# Patient Record
Sex: MALE | Race: WHITE | NOT HISPANIC OR LATINO | Employment: FULL TIME | RURAL
[De-identification: names, ages, dates, MRNs, and addresses within clinical notes are randomized per-mention and may not be internally consistent; named-entity substitution may affect disease eponyms.]

---

## 2017-02-22 ENCOUNTER — CONVERSION ENCOUNTER (OUTPATIENT)
Dept: FAMILY MEDICINE CLINIC | Facility: CLINIC | Age: 62
End: 2017-02-22

## 2017-02-23 LAB
ALBUMIN SERPL-MCNC: 4.1 G/DL (ref 3.6–5.1)
ALP SERPL-CCNC: 67 U/L (ref 40–115)
AST SERPL-CCNC: 34 U/L (ref 10–35)
BASOPHILS # BLD AUTO: 100 CELLS/UL (ref 0–200)
BILIRUB SERPL-MCNC: 0.9 MG/DL (ref 0.2–1.2)
BUN SERPL-MCNC: 20 MG/DL (ref 7–25)
BUN/CREAT SERPL: 20 (CALC) (ref 6–22)
CALCIUM SERPL-MCNC: 9.8 MG/DL (ref 8.6–10.2)
CHLORIDE SERPL-SCNC: 107 MMOL/L (ref 98–110)
CHOLEST SERPL-MCNC: 204 MG/DL (ref 125–200)
CONV CO2: 30 MMOL/L (ref 21–33)
CONV TOTAL PROTEIN: 6.7 G/DL (ref 6.2–8.3)
CREAT UR-MCNC: 1 MG/DL (ref 0.76–1.46)
EOSINOPHIL # BLD AUTO: 400 CELLS/UL (ref 15–500)
EOSINOPHIL # BLD AUTO: 6 %
ERYTHROCYTE [DISTWIDTH] IN BLOOD BY AUTOMATED COUNT: 13.7 % (ref 11–15)
GLOBULIN UR ELPH-MCNC: 2.6 MG/DL (ref 2.1–3.7)
GLUCOSE UR QL: 131 MG/DL (ref 65–99)
HCT VFR BLD AUTO: 47.9 % (ref 38.5–50)
HDLC SERPL-MCNC: 40 MG/DL
HGB BLD-MCNC: 16.2 G/DL (ref 13.2–17.1)
INSULIN SERPL-ACNC: 1.6 (CALC) (ref 1–2.1)
LDLC SERPL CALC-MCNC: 136 MG/DL
LYMPHOCYTES # BLD AUTO: 1600 CELLS/UL (ref 850–3900)
LYMPHOCYTES NFR BLD AUTO: 28 %
MCH RBC QN AUTO: 32.3 PG (ref 27–33)
MCHC RBC AUTO-ENTMCNC: 33.9 G/DL (ref 32–36)
MCV RBC AUTO: 95.3 FL (ref 80–100)
MONOCYTES # BLD AUTO: 600 CELLS/UL (ref 200–950)
MONOCYTES NFR BLD AUTO: 10 %
NEUTROPHILS # BLD AUTO: 3200 CELLS/UL (ref 1500–7800)
NEUTROPHILS NFR BLD AUTO: 55 %
PLATELET # BLD AUTO: 232 10*3/UL (ref 140–400)
PMV BLD AUTO: 8.8 FL (ref 7.5–11.5)
POTASSIUM SERPL-SCNC: 4.8 MMOL/L (ref 3.5–5.3)
PSA SERPL-MCNC: 0.3 NG/ML
RBC # BLD AUTO: 5.03 MILLION/UL (ref 4.2–5.8)
SODIUM SERPL-SCNC: 146 MMOL/L (ref 135–146)
TRIGL SERPL-MCNC: 138 MG/DL
TSH SERPL-ACNC: 4.64 MIU/L (ref 0.4–4.5)
WBC # BLD AUTO: 5.8 10*3/UL (ref 3.8–10.8)

## 2017-03-16 ENCOUNTER — HOSPITAL ENCOUNTER (OUTPATIENT)
Dept: OTHER | Facility: HOSPITAL | Age: 62
Discharge: HOME | End: 2017-03-16
Attending: FAMILY MEDICINE | Admitting: FAMILY MEDICINE

## 2017-07-03 ENCOUNTER — HOSPITAL ENCOUNTER (OUTPATIENT)
Dept: PREADMISSION TESTING | Facility: HOSPITAL | Age: 62
Discharge: HOME | End: 2017-07-03
Attending: NEUROLOGICAL SURGERY | Admitting: NEUROLOGICAL SURGERY

## 2017-07-03 LAB
ABO + RH BLD: NORMAL
ALBUMIN SERPL-MCNC: 4.3 G/DL (ref 3.5–4.8)
ALBUMIN/GLOB SERPL: 1.4 {RATIO} (ref 1–1.7)
ALP SERPL-CCNC: 65 IU/L (ref 32–91)
ALT SERPL-CCNC: 75 IU/L (ref 17–63)
ANION GAP SERPL CALC-SCNC: 14 MMOL/L (ref 10–20)
APTT BLD: 22.3 SEC (ref 24–31)
ARMBAND: NORMAL
AST SERPL-CCNC: 40 IU/L (ref 15–41)
BACTERIA SPEC AEROBE CULT: NORMAL
BASOPHILS # BLD AUTO: 0.1 10*3/UL (ref 0–0.2)
BASOPHILS NFR BLD AUTO: 1 % (ref 0–2)
BILIRUB SERPL-MCNC: 0.9 MG/DL (ref 0.3–1.2)
BILIRUB UR QL STRIP: NEGATIVE MG/DL
BLD COMPONENT TYPE: NORMAL
BLD GP AB SCN SERPL QL: NEGATIVE
BUN SERPL-MCNC: 16 MG/DL (ref 8–20)
BUN/CREAT SERPL: 17.8 (ref 6.2–20.3)
CALCIUM SERPL-MCNC: 9.5 MG/DL (ref 8.9–10.3)
CASTS URNS QL MICRO: NORMAL /[LPF]
CHLORIDE SERPL-SCNC: 103 MMOL/L (ref 101–111)
COLOR UR: YELLOW
CONV BACTERIA IN URINE MICRO: NEGATIVE
CONV CLARITY OF URINE: CLEAR
CONV CO2: 24 MMOL/L (ref 22–32)
CONV HYALINE CASTS IN URINE MICRO: 0 /[LPF] (ref 0–5)
CONV PROTEIN IN URINE BY AUTOMATED TEST STRIP: NEGATIVE MG/DL
CONV SMALL ROUND CELLS: NORMAL /[HPF]
CONV TOTAL PROTEIN: 7.4 G/DL (ref 6.1–7.9)
CONV UROBILINOGEN IN URINE BY AUTOMATED TEST STRIP: 0.2 MG/DL
CREAT UR-MCNC: 0.9 MG/DL (ref 0.7–1.2)
CROSSMATCH EXPIRATION: NORMAL
CULTURE INDICATED?: NORMAL
DIFFERENTIAL METHOD BLD: (no result)
EOSINOPHIL # BLD AUTO: 0.2 10*3/UL (ref 0–0.3)
EOSINOPHIL # BLD AUTO: 4 % (ref 0–3)
ERYTHROCYTE [DISTWIDTH] IN BLOOD BY AUTOMATED COUNT: 13.3 % (ref 11.5–14.5)
GLOBULIN UR ELPH-MCNC: 3.1 G/DL (ref 2.5–3.8)
GLUCOSE SERPL-MCNC: 130 MG/DL (ref 65–99)
GLUCOSE UR QL: NEGATIVE MG/DL
HCT VFR BLD AUTO: 44.6 % (ref 40–54)
HGB BLD-MCNC: 15.3 G/DL (ref 14–18)
HGB UR QL STRIP: NEGATIVE
INR PPP: 0.9
KETONES UR QL STRIP: NEGATIVE MG/DL
LEUKOCYTE ESTERASE UR QL STRIP: NEGATIVE
LYMPHOCYTES # BLD AUTO: 1.7 10*3/UL (ref 0.8–4.8)
LYMPHOCYTES NFR BLD AUTO: 26 % (ref 18–42)
Lab: NORMAL
MCH RBC QN AUTO: 32.4 PG (ref 26–32)
MCHC RBC AUTO-ENTMCNC: 34.2 G/DL (ref 32–36)
MCV RBC AUTO: 94.5 FL (ref 80–94)
MICRO REPORT STATUS: NORMAL
MONOCYTES # BLD AUTO: 0.5 10*3/UL (ref 0.1–1.3)
MONOCYTES NFR BLD AUTO: 8 % (ref 2–11)
NEUTROPHILS # BLD AUTO: 3.8 10*3/UL (ref 2.3–8.6)
NEUTROPHILS NFR BLD AUTO: 61 % (ref 50–75)
NITRITE UR QL STRIP: NEGATIVE
NRBC BLD AUTO-RTO: 0 /100{WBCS}
NRBC/RBC NFR BLD MANUAL: 0 10*3/UL
PH UR STRIP.AUTO: 5.5 [PH] (ref 4.5–8)
PLATELET # BLD AUTO: 246 10*3/UL (ref 150–450)
PMV BLD AUTO: 8.6 FL (ref 7.4–10.4)
POTASSIUM SERPL-SCNC: 4 MMOL/L (ref 3.6–5.1)
PROTHROMBIN TIME: 10.4 SEC (ref 9.6–11.7)
RBC # BLD AUTO: 4.72 10*6/UL (ref 4.6–6)
RBC #/AREA URNS HPF: 0 /[HPF] (ref 0–3)
SODIUM SERPL-SCNC: 137 MMOL/L (ref 136–144)
SP GR UR: 1.02 (ref 1–1.03)
SPECIMEN SOURCE: NORMAL
SPECIMEN SOURCE: NORMAL
SPERM URNS QL MICRO: NORMAL /[HPF]
SQUAMOUS SPT QL MICRO: 0 /[HPF] (ref 0–5)
UNIDENT CRYS URNS QL MICRO: NORMAL /[HPF]
WBC # BLD AUTO: 6.3 10*3/UL (ref 4.5–11.5)
WBC #/AREA URNS HPF: 1 /[HPF] (ref 0–5)
YEAST SPEC QL WET PREP: NORMAL /[HPF]

## 2017-07-13 ENCOUNTER — HOSPITAL ENCOUNTER (OUTPATIENT)
Dept: SURGERY | Facility: HOSPITAL | Age: 62
Setting detail: HOSPITAL OUTPATIENT SURGERY
Discharge: HOME | End: 2017-07-13
Attending: NEUROLOGICAL SURGERY | Admitting: NEUROLOGICAL SURGERY

## 2017-08-03 ENCOUNTER — HOSPITAL ENCOUNTER (OUTPATIENT)
Dept: PHYSICAL THERAPY | Facility: HOSPITAL | Age: 62
Setting detail: RECURRING SERIES
Discharge: HOME | End: 2017-09-01
Attending: NEUROLOGICAL SURGERY | Admitting: NEUROLOGICAL SURGERY

## 2018-03-19 ENCOUNTER — CONVERSION ENCOUNTER (OUTPATIENT)
Dept: FAMILY MEDICINE CLINIC | Facility: CLINIC | Age: 63
End: 2018-03-19

## 2018-03-19 LAB
TESTOST FREE SERPL-MCNC: 56.7 PG/ML (ref 35–155)
TESTOST SERPL-MCNC: 374 NG/DL (ref 250–1100)

## 2018-03-24 LAB
ALBUMIN SERPL-MCNC: 4.4 G/DL (ref 3.6–5.1)
ALP SERPL-CCNC: 74 U/L (ref 40–115)
AST SERPL-CCNC: 37 U/L (ref 10–35)
BASOPHILS # BLD AUTO: 100 CELLS/UL (ref 0–200)
BILIRUB SERPL-MCNC: 0.4 MG/DL (ref 0.2–1.2)
BUN SERPL-MCNC: 14 MG/DL (ref 7–25)
BUN/CREAT SERPL: 15.6 (CALC) (ref 6–22)
CALCIUM SERPL-MCNC: 9.6 MG/DL (ref 8.6–10.2)
CHLORIDE SERPL-SCNC: 102 MMOL/L (ref 98–110)
CHOLEST SERPL-MCNC: 184 MG/DL (ref 125–200)
CONV CO2: 27 MMOL/L (ref 21–33)
CONV TOTAL PROTEIN: 6.5 G/DL (ref 6.2–8.3)
CREAT UR-MCNC: 0.9 MG/DL (ref 0.76–1.46)
EOSINOPHIL # BLD AUTO: 500 CELLS/UL (ref 15–500)
EOSINOPHIL # BLD AUTO: 7 %
ERYTHROCYTE [DISTWIDTH] IN BLOOD BY AUTOMATED COUNT: 13.7 % (ref 11–15)
GLOBULIN UR ELPH-MCNC: 2.1 MG/DL (ref 2.1–3.7)
GLUCOSE UR QL: 156 MG/DL (ref 65–99)
HCT VFR BLD AUTO: 43.9 % (ref 38.5–50)
HDLC SERPL-MCNC: 38 MG/DL
HGB BLD-MCNC: 15.2 G/DL (ref 13.2–17.1)
INSULIN SERPL-ACNC: 2.1 (CALC) (ref 1–2.1)
LDLC SERPL CALC-MCNC: 120 MG/DL
LYMPHOCYTES # BLD AUTO: 2000 CELLS/UL (ref 850–3900)
LYMPHOCYTES NFR BLD AUTO: 32 %
MCH RBC QN AUTO: 32.9 PG (ref 27–33)
MCHC RBC AUTO-ENTMCNC: 34.7 G/DL (ref 32–36)
MCV RBC AUTO: 95 FL (ref 80–100)
MONOCYTES # BLD AUTO: 600 CELLS/UL (ref 200–950)
MONOCYTES NFR BLD AUTO: 9 %
NEUTROPHILS # BLD AUTO: 3300 CELLS/UL (ref 1500–7800)
NEUTROPHILS NFR BLD AUTO: 51 %
PLATELET # BLD AUTO: 255 10*3/UL (ref 140–400)
PMV BLD AUTO: 8.2 FL (ref 7.5–11.5)
POTASSIUM SERPL-SCNC: 4.3 MMOL/L (ref 3.5–5.3)
PSA SERPL-MCNC: 0.4 NG/ML
RBC # BLD AUTO: 4.62 MILLION/UL (ref 4.2–5.8)
SODIUM SERPL-SCNC: 138 MMOL/L (ref 135–146)
TRIGL SERPL-MCNC: 131 MG/DL
WBC # BLD AUTO: 6.4 10*3/UL (ref 3.8–10.8)

## 2018-04-16 ENCOUNTER — HOSPITAL ENCOUNTER (OUTPATIENT)
Dept: PHYSICAL THERAPY | Facility: HOSPITAL | Age: 63
Setting detail: RECURRING SERIES
Discharge: HOME | End: 2018-05-29
Attending: NEUROLOGICAL SURGERY | Admitting: NEUROLOGICAL SURGERY

## 2018-05-25 ENCOUNTER — HOSPITAL ENCOUNTER (OUTPATIENT)
Dept: PREADMISSION TESTING | Facility: HOSPITAL | Age: 63
Discharge: HOME | End: 2018-05-25
Attending: NEUROLOGICAL SURGERY | Admitting: NEUROLOGICAL SURGERY

## 2018-05-25 LAB
ABO + RH BLD: NORMAL
ALBUMIN SERPL-MCNC: 3.9 G/DL (ref 3.5–4.8)
ALBUMIN/GLOB SERPL: 1.3 {RATIO} (ref 1–1.7)
ALP SERPL-CCNC: 66 IU/L (ref 32–91)
ALT SERPL-CCNC: 44 IU/L (ref 17–63)
ANION GAP SERPL CALC-SCNC: 11.2 MMOL/L (ref 10–20)
APTT BLD: 22.8 SEC (ref 24–31)
ARMBAND: NORMAL
AST SERPL-CCNC: 31 IU/L (ref 15–41)
BACTERIA SPEC AEROBE CULT: NORMAL
BASOPHILS # BLD AUTO: 0.1 10*3/UL (ref 0–0.2)
BASOPHILS NFR BLD AUTO: 1 % (ref 0–2)
BILIRUB SERPL-MCNC: 0.8 MG/DL (ref 0.3–1.2)
BILIRUB UR QL STRIP: NEGATIVE MG/DL
BLD COMPONENT TYPE: NORMAL
BLD GP AB SCN SERPL QL: NEGATIVE
BUN SERPL-MCNC: 20 MG/DL (ref 8–20)
BUN/CREAT SERPL: 20 (ref 6.2–20.3)
CALCIUM SERPL-MCNC: 9.7 MG/DL (ref 8.9–10.3)
CASTS URNS QL MICRO: NORMAL /[LPF]
CHLORIDE SERPL-SCNC: 101 MMOL/L (ref 101–111)
COLOR UR: YELLOW
CONV BACTERIA IN URINE MICRO: NEGATIVE
CONV CLARITY OF URINE: CLEAR
CONV CO2: 28 MMOL/L (ref 22–32)
CONV HYALINE CASTS IN URINE MICRO: 0 /[LPF] (ref 0–5)
CONV PROTEIN IN URINE BY AUTOMATED TEST STRIP: NEGATIVE MG/DL
CONV SMALL ROUND CELLS: NORMAL /[HPF]
CONV TOTAL PROTEIN: 6.8 G/DL (ref 6.1–7.9)
CONV UROBILINOGEN IN URINE BY AUTOMATED TEST STRIP: 0.2 MG/DL
CREAT UR-MCNC: 1 MG/DL (ref 0.7–1.2)
CROSSMATCH EXPIRATION: NORMAL
CULTURE INDICATED?: NORMAL
DIFFERENTIAL METHOD BLD: (no result)
EOSINOPHIL # BLD AUTO: 0.4 10*3/UL (ref 0–0.3)
EOSINOPHIL # BLD AUTO: 6 % (ref 0–3)
ERYTHROCYTE [DISTWIDTH] IN BLOOD BY AUTOMATED COUNT: 13.5 % (ref 11.5–14.5)
GLOBULIN UR ELPH-MCNC: 2.9 G/DL (ref 2.5–3.8)
GLUCOSE SERPL-MCNC: 105 MG/DL (ref 65–99)
GLUCOSE UR QL: NEGATIVE MG/DL
HCT VFR BLD AUTO: 46.2 % (ref 40–54)
HGB BLD-MCNC: 15.5 G/DL (ref 14–18)
HGB UR QL STRIP: NEGATIVE
INR PPP: 1
KETONES UR QL STRIP: NEGATIVE MG/DL
LEUKOCYTE ESTERASE UR QL STRIP: NEGATIVE
LYMPHOCYTES # BLD AUTO: 1.7 10*3/UL (ref 0.8–4.8)
LYMPHOCYTES NFR BLD AUTO: 24 % (ref 18–42)
Lab: NORMAL
MCH RBC QN AUTO: 32.7 PG (ref 26–32)
MCHC RBC AUTO-ENTMCNC: 33.5 G/DL (ref 32–36)
MCV RBC AUTO: 97.5 FL (ref 80–94)
MICRO REPORT STATUS: NORMAL
MONOCYTES # BLD AUTO: 0.7 10*3/UL (ref 0.1–1.3)
MONOCYTES NFR BLD AUTO: 11 % (ref 2–11)
NEUTROPHILS # BLD AUTO: 4 10*3/UL (ref 2.3–8.6)
NEUTROPHILS NFR BLD AUTO: 58 % (ref 50–75)
NITRITE UR QL STRIP: NEGATIVE
NRBC BLD AUTO-RTO: 0 /100{WBCS}
NRBC/RBC NFR BLD MANUAL: 0 10*3/UL
PH UR STRIP.AUTO: 6.5 [PH] (ref 4.5–8)
PLATELET # BLD AUTO: 253 10*3/UL (ref 150–450)
PMV BLD AUTO: 8.2 FL (ref 7.4–10.4)
POTASSIUM SERPL-SCNC: 4.2 MMOL/L (ref 3.6–5.1)
PROTHROMBIN TIME: 10.5 SEC (ref 9.6–11.7)
RBC # BLD AUTO: 4.73 10*6/UL (ref 4.6–6)
RBC #/AREA URNS HPF: 0 /[HPF] (ref 0–3)
SODIUM SERPL-SCNC: 136 MMOL/L (ref 136–144)
SP GR UR: 1.03 (ref 1–1.03)
SPECIMEN SOURCE: NORMAL
SPERM URNS QL MICRO: NORMAL /[HPF]
SQUAMOUS SPT QL MICRO: 0 /[HPF] (ref 0–5)
UNIDENT CRYS URNS QL MICRO: NORMAL /[HPF]
WBC # BLD AUTO: 6.9 10*3/UL (ref 4.5–11.5)
WBC #/AREA URNS HPF: 1 /[HPF] (ref 0–5)
YEAST SPEC QL WET PREP: NORMAL /[HPF]

## 2018-06-07 ENCOUNTER — HOSPITAL ENCOUNTER (OUTPATIENT)
Dept: PREOP | Facility: HOSPITAL | Age: 63
Setting detail: HOSPITAL OUTPATIENT SURGERY
Discharge: HOME | End: 2018-06-07
Attending: NEUROLOGICAL SURGERY | Admitting: NEUROLOGICAL SURGERY

## 2018-06-07 LAB
GLUCOSE BLD-MCNC: 111 MG/DL (ref 70–105)
GLUCOSE BLD-MCNC: 145 MG/DL (ref 70–105)
GLUCOSE BLD-MCNC: 156 MG/DL (ref 70–105)

## 2018-06-29 ENCOUNTER — HOSPITAL ENCOUNTER (OUTPATIENT)
Dept: PHYSICAL THERAPY | Facility: HOSPITAL | Age: 63
Setting detail: RECURRING SERIES
Discharge: HOME | End: 2018-07-31
Attending: NEUROLOGICAL SURGERY | Admitting: NEUROLOGICAL SURGERY

## 2019-07-10 RX ORDER — MELOXICAM 15 MG/1
TABLET ORAL
Qty: 90 TABLET | Refills: 4 | Status: SHIPPED | OUTPATIENT
Start: 2019-07-10 | End: 2020-07-13

## 2019-07-14 DIAGNOSIS — M54.5 LOW BACK PAIN, UNSPECIFIED BACK PAIN LATERALITY, UNSPECIFIED CHRONICITY, WITH SCIATICA PRESENCE UNSPECIFIED: Primary | ICD-10-CM

## 2019-07-19 DIAGNOSIS — M54.5 LOW BACK PAIN, UNSPECIFIED BACK PAIN LATERALITY, UNSPECIFIED CHRONICITY, WITH SCIATICA PRESENCE UNSPECIFIED: Primary | ICD-10-CM

## 2019-07-19 RX ORDER — GABAPENTIN 100 MG/1
3 CAPSULE ORAL EVERY 24 HOURS
COMMUNITY
Start: 2018-08-16 | End: 2019-07-19 | Stop reason: SDUPTHER

## 2019-07-19 RX ORDER — GABAPENTIN 100 MG/1
CAPSULE ORAL
Qty: 180 CAPSULE | Refills: 2 | Status: SHIPPED | OUTPATIENT
Start: 2019-07-19 | End: 2019-10-02 | Stop reason: SDUPTHER

## 2019-07-19 RX ORDER — GABAPENTIN 100 MG/1
300 CAPSULE ORAL EVERY 24 HOURS
Qty: 90 CAPSULE | Refills: 0 | Status: SHIPPED | OUTPATIENT
Start: 2019-07-19 | End: 2019-08-27

## 2019-07-19 NOTE — TELEPHONE ENCOUNTER
PATIENT WOULD LIKE TO HAVE LABS PUT IN TO HAVE THEM DRAWN AT [facility] PRIOR TO HIS PE APPOINTMENT. HIS [facility] APPT IS ON 8/20/19

## 2019-08-12 PROBLEM — J45.909 ASTHMA: Status: ACTIVE | Noted: 2019-08-12

## 2019-08-12 PROBLEM — I10 HYPERTENSION, BENIGN: Status: ACTIVE | Noted: 2018-08-16

## 2019-08-12 PROBLEM — E78.5 HYPERLIPIDEMIA: Status: ACTIVE | Noted: 2019-08-12

## 2019-08-12 PROBLEM — G47.33 OBSTRUCTIVE SLEEP APNEA: Status: ACTIVE | Noted: 2018-08-16

## 2019-08-12 PROBLEM — E11.9 TYPE II DIABETES MELLITUS: Status: ACTIVE | Noted: 2019-08-12

## 2019-08-12 PROBLEM — N52.9 IMPOTENCE OF ORGANIC ORIGIN: Status: ACTIVE | Noted: 2019-08-12

## 2019-08-12 PROBLEM — G56.00 CARPAL TUNNEL SYNDROME: Status: ACTIVE | Noted: 2019-08-12

## 2019-08-12 PROBLEM — F43.0 ACUTE REACTION TO STRESS: Status: ACTIVE | Noted: 2019-08-12

## 2019-08-12 PROBLEM — Z00.01 ENCOUNTER FOR ANNUAL GENERAL MEDICAL EXAMINATION WITH ABNORMAL FINDINGS IN ADULT: Status: ACTIVE | Noted: 2019-08-12

## 2019-08-12 PROBLEM — D12.6 BENIGN NEOPLASM OF COLON: Status: ACTIVE | Noted: 2019-08-12

## 2019-08-12 PROBLEM — M54.41 ACUTE RIGHT-SIDED LOW BACK PAIN WITH RIGHT-SIDED SCIATICA: Status: ACTIVE | Noted: 2019-08-12

## 2019-08-12 PROBLEM — M51.9 LUMBAR DISC DISEASE: Status: ACTIVE | Noted: 2019-08-12

## 2019-08-12 RX ORDER — ASPIRIN 81 MG/1
81 TABLET, CHEWABLE ORAL DAILY
COMMUNITY

## 2019-08-12 RX ORDER — SILDENAFIL CITRATE 20 MG/1
1 TABLET ORAL
COMMUNITY
End: 2019-09-01

## 2019-08-12 RX ORDER — CYCLOBENZAPRINE HCL 10 MG
.5-1 TABLET ORAL DAILY
COMMUNITY
Start: 2018-12-18 | End: 2019-09-30

## 2019-08-12 RX ORDER — LISINOPRIL 20 MG/1
1 TABLET ORAL DAILY
COMMUNITY
Start: 2018-10-23 | End: 2019-10-09 | Stop reason: SDUPTHER

## 2019-08-12 RX ORDER — NYSTATIN 10B UNIT
1 POWDER (EA) MISCELLANEOUS 2 TIMES DAILY PRN
COMMUNITY
Start: 2018-10-22

## 2019-08-12 RX ORDER — TIZANIDINE 4 MG/1
.5-1 TABLET ORAL NIGHTLY PRN
COMMUNITY
Start: 2018-09-14 | End: 2019-09-30

## 2019-08-13 ENCOUNTER — HOSPITAL ENCOUNTER (OUTPATIENT)
Dept: GENERAL RADIOLOGY | Facility: HOSPITAL | Age: 64
Discharge: HOME | End: 2019-08-13

## 2019-08-13 ENCOUNTER — OFFICE VISIT (OUTPATIENT)
Dept: FAMILY MEDICINE CLINIC | Facility: CLINIC | Age: 64
End: 2019-08-13

## 2019-08-13 ENCOUNTER — HOSPITAL ENCOUNTER (OUTPATIENT)
Dept: GENERAL RADIOLOGY | Facility: HOSPITAL | Age: 64
Discharge: HOME | End: 2019-08-13
Admitting: FAMILY MEDICINE

## 2019-08-13 VITALS
HEIGHT: 72 IN | TEMPERATURE: 97.3 F | OXYGEN SATURATION: 97 % | RESPIRATION RATE: 17 BRPM | SYSTOLIC BLOOD PRESSURE: 138 MMHG | WEIGHT: 216.6 LBS | DIASTOLIC BLOOD PRESSURE: 84 MMHG | BODY MASS INDEX: 29.34 KG/M2 | HEART RATE: 88 BPM

## 2019-08-13 DIAGNOSIS — M51.9 LUMBAR DISC DISEASE: ICD-10-CM

## 2019-08-13 DIAGNOSIS — M54.41 ACUTE RIGHT-SIDED LOW BACK PAIN WITH RIGHT-SIDED SCIATICA: ICD-10-CM

## 2019-08-13 DIAGNOSIS — Z12.5 SCREENING PSA (PROSTATE SPECIFIC ANTIGEN): ICD-10-CM

## 2019-08-13 DIAGNOSIS — M16.12 PRIMARY OSTEOARTHRITIS OF LEFT HIP: ICD-10-CM

## 2019-08-13 DIAGNOSIS — E11.9 TYPE 2 DIABETES MELLITUS WITHOUT COMPLICATION, WITHOUT LONG-TERM CURRENT USE OF INSULIN (HCC): Primary | ICD-10-CM

## 2019-08-13 DIAGNOSIS — E55.9 VITAMIN D DEFICIENCY: ICD-10-CM

## 2019-08-13 DIAGNOSIS — I10 HYPERTENSION, BENIGN: ICD-10-CM

## 2019-08-13 DIAGNOSIS — I10 BENIGN HYPERTENSION: ICD-10-CM

## 2019-08-13 DIAGNOSIS — E78.5 HYPERLIPIDEMIA, UNSPECIFIED HYPERLIPIDEMIA TYPE: ICD-10-CM

## 2019-08-13 PROBLEM — R53.83 OTHER FATIGUE: Status: ACTIVE | Noted: 2019-08-13

## 2019-08-13 PROBLEM — R41.3 MEMORY LOSS: Status: ACTIVE | Noted: 2019-08-13

## 2019-08-13 PROBLEM — M25.552 PAIN OF LEFT HIP JOINT: Status: ACTIVE | Noted: 2019-08-13

## 2019-08-13 LAB — HBA1C MFR BLD: 5.9 %

## 2019-08-13 PROCEDURE — 73502 X-RAY EXAM HIP UNI 2-3 VIEWS: CPT

## 2019-08-13 PROCEDURE — 83036 HEMOGLOBIN GLYCOSYLATED A1C: CPT | Performed by: FAMILY MEDICINE

## 2019-08-13 PROCEDURE — 72110 X-RAY EXAM L-2 SPINE 4/>VWS: CPT

## 2019-08-13 PROCEDURE — 99214 OFFICE O/P EST MOD 30 MIN: CPT | Performed by: FAMILY MEDICINE

## 2019-08-13 NOTE — PROGRESS NOTES
Subjective   Rudy Henriquez Jr. is a 64 y.o. male.     Chief Complaint   Patient presents with   • Diabetes     last micro was 20 on 10/22/2018   • Leg Pain     started 5 months ago and is increasing       Diabetes   He presents for his follow-up diabetic visit. He has type 2 diabetes mellitus. His disease course has been stable. There are no hypoglycemic associated symptoms. Pertinent negatives for hypoglycemia include no pallor or seizures. Associated symptoms include foot paresthesias. Pertinent negatives for diabetes include no chest pain, no polydipsia and no polyphagia. There are no hypoglycemic complications. Symptoms are stable. Risk factors for coronary artery disease include diabetes mellitus, hypertension and male sex. Current diabetic treatment includes diet. He is compliant with treatment all of the time. His weight is stable. He is following a diabetic diet. He has not had a previous visit with a dietitian. He participates in exercise daily. There is no change in his home blood glucose trend. His breakfast blood glucose is taken between 8-9 am. His breakfast blood glucose range is generally  mg/dl. An ACE inhibitor/angiotensin II receptor blocker is being taken. He does not see a podiatrist.Eye exam is not current.   Leg Pain    The incident occurred more than 1 week ago. There was no injury mechanism. The pain is present in the left hip and left knee. The quality of the pain is described as stabbing. The pain has been constant since onset. Associated symptoms include an inability to bear weight, muscle weakness and numbness. He reports no foreign bodies present. The symptoms are aggravated by weight bearing and movement. He has tried acetaminophen for the symptoms. The treatment provided no relief.            I personally reviewed and updated the patient's allergies, medications, problem list, and past medical, surgical, social, and family history.     Family History   Problem Relation Age of  Onset   • Other Mother         malignant neoplasm of colon   • Other Father         malignant neoplasm of traches, bronchi, and lungs   • Diabetes Sister        Social History     Tobacco Use   • Smoking status: Never Smoker   • Smokeless tobacco: Never Used   Substance Use Topics   • Alcohol use: No     Frequency: Never   • Drug use: No       Past Surgical History:   Procedure Laterality Date   • BACK SURGERY  06/07/2018    on L4 and L5 by dr landa   • TONSILLECTOMY AND ADENOIDECTOMY      at age 48       Patient Active Problem List   Diagnosis   • Acute reaction to stress   • Asthma   • Obstructive sleep apnea   • Acute left-sided low back pain with right-sided sciatica   • Benign neoplasm of colon   • Carpal tunnel syndrome   • Encounter for annual general medical examination with abnormal findings in adult   • Hyperlipidemia   • Hypertension, benign   • Impotence of organic origin   • Lumbar disc disease   • Type II diabetes mellitus (CMS/HCC)   • Sleep apnea   • Hyperlipemia   • Benign hypertension   • Tremor   • Type 2 diabetes mellitus (CMS/HCC)   • Memory loss   • Other fatigue   • Pain of left hip joint   • Osteoarthritis of left hip         Current Outpatient Medications:   •  aspirin 81 MG chewable tablet, Chew., Disp: , Rfl:   •  cyclobenzaprine (FLEXERIL) 10 MG tablet, Take 0.5-1 tablets by mouth Daily., Disp: , Rfl:   •  gabapentin (NEURONTIN) 100 MG capsule, TAKE 2 CAPSULES BY MOUTH 3 TIMES A DAY, Disp: 180 capsule, Rfl: 2  •  gabapentin (NEURONTIN) 100 MG capsule, Take 3 capsules by mouth Daily., Disp: 90 capsule, Rfl: 0  •  lisinopril (PRINIVIL,ZESTRIL) 20 MG tablet, Take 1 tablet by mouth Daily., Disp: , Rfl:   •  meloxicam (MOBIC) 15 MG tablet, TAKE 1 TABLET BY MOUTH EVERY DAY, Disp: 90 tablet, Rfl: 4  •  metFORMIN (GLUCOPHAGE) 500 MG tablet, Take 1 tablet by mouth 2 (Two) Times a Day., Disp: , Rfl:   •  Multiple Vitamins-Minerals (MULTIVITAMIN ADULT PO), Take  by mouth., Disp: , Rfl:   •  Nystatin  "powder, Apply 1 application topically to the appropriate area as directed 2 (Two) Times a Day As Needed., Disp: , Rfl:   •  sildenafil (REVATIO) 20 MG tablet, Take 1 tablet by mouth., Disp: , Rfl:   •  tiZANidine (ZANAFLEX) 4 MG tablet, Take 0.5-1 tablets by mouth At Night As Needed., Disp: , Rfl:           Review of Systems   Constitutional: Negative for chills and diaphoresis.   Eyes: Negative for visual disturbance.   Respiratory: Negative for shortness of breath.    Cardiovascular: Negative for chest pain and palpitations.   Gastrointestinal: Negative for abdominal pain and nausea.   Endocrine: Negative for polydipsia and polyphagia.   Musculoskeletal: Negative for neck stiffness.   Skin: Negative for color change and pallor.   Neurological: Positive for numbness. Negative for seizures and syncope.   Hematological: Negative for adenopathy.       Objective   /84   Pulse 88   Temp 97.3 °F (36.3 °C)   Resp 17   Ht 181.6 cm (71.5\")   Wt 98.2 kg (216 lb 9.6 oz)   SpO2 97%   BMI 29.79 kg/m²   BP Readings from Last 3 Encounters:   08/13/19 138/84   12/18/18 134/72   10/22/18 138/82     Wt Readings from Last 3 Encounters:   08/13/19 98.2 kg (216 lb 9.6 oz)   12/18/18 99.7 kg (219 lb 12.8 oz)   10/22/18 99 kg (218 lb 3.2 oz)         Hemoglobin A1C   Date Value Ref Range Status   08/13/2019 5.9 % Final   10/22/2018 5.7 4.6 - 7.1 % Final   07/06/2018 5.8 4.6 - 7.1 % Final         Physical Exam   Constitutional: He is oriented to person, place, and time. He appears well-developed and well-nourished.   Eyes: Conjunctivae, EOM and lids are normal. Pupils are equal, round, and reactive to light. Right conjunctiva is not injected. Right conjunctiva has no hemorrhage. Left conjunctiva is not injected. Left conjunctiva has no hemorrhage. Right eye exhibits no nystagmus. Left eye exhibits no nystagmus.   Fundoscopic exam:       The right eye shows no arteriolar narrowing, no AV nicking, no exudate, no hemorrhage and " no papilledema. The right eye shows no venous pulsations.        The left eye shows no arteriolar narrowing, no AV nicking, no exudate, no hemorrhage and no papilledema. The left eye shows no venous pulsations.   Cardiovascular: Normal rate, regular rhythm, S1 normal, S2 normal, normal heart sounds, intact distal pulses and normal pulses. Exam reveals no gallop and no friction rub.   No murmur heard.  Pulmonary/Chest: Effort normal and breath sounds normal. No accessory muscle usage or stridor. He has no decreased breath sounds. He has no wheezes. He has no rhonchi. He has no rales.   Abdominal: Soft. Normal appearance, normal aorta and bowel sounds are normal. He exhibits no distension, no pulsatile midline mass and no mass. There is no hepatosplenomegaly. There is no tenderness. There is no rigidity, no rebound, no guarding, no CVA tenderness and negative Saenz's sign. No hernia.   Musculoskeletal:        Right hip: He exhibits normal range of motion, normal strength, no tenderness, no swelling, no crepitus and no deformity.        Left hip: Normal. He exhibits normal range of motion, normal strength, no tenderness, no swelling, no crepitus and no deformity.        Thoracic back: Normal. He exhibits normal range of motion, no tenderness, no swelling, no edema, no deformity, no laceration and no spasm.        Lumbar back: He exhibits normal range of motion, no tenderness, no swelling, no edema, no deformity and no spasm.    Rudy had a diabetic foot exam performed today.   During the foot exam he had a monofilament test performed.    Neurological Sensory Findings - Unaltered hot/cold right ankle/foot discrimination and unaltered hot/cold left ankle/foot discrimination. Unaltered sharp/dull right ankle/foot discrimination and unaltered sharp/dull left ankle/foot discrimination. No right ankle/foot altered proprioception and no left ankle/foot altered proprioception  Vascular Status -  His right foot exhibits  normal foot vasculature  and no edema. His left foot exhibits normal foot vasculature  and no edema.  Skin Integrity  -  His right foot skin is intact.His left foot skin is intact..  Neurological: He is alert and oriented to person, place, and time. He has normal strength and normal reflexes. He displays no atrophy. No sensory deficit. He exhibits normal muscle tone. Coordination and gait normal.   Skin: Skin is warm and dry. Turgor is normal. He is not diaphoretic. No pallor.         Assessment/Plan   Problem List Items Addressed This Visit        Cardiovascular and Mediastinum    Hyperlipidemia    Overview     Discussed diet, exercise, lifestyle modification.  mild elevation  Follow up recheck    -------------  Stable  Compliant with meds   Is getting adequate diet and exercise  Goals developed at last visit were met   Care management needs are self addressed.         Relevant Orders    Comprehensive Metabolic Panel (Completed)    Lipid Panel With / Chol / HDL Ratio (Completed)    Hypertension, benign    Overview     Discussed low sodium diet, lifestyle modification.  not tolerating lisinopril / change to carvedoilol  monitor bp  Follow up rechrck  Call / return if worsening symptoms.         Relevant Medications    lisinopril (PRINIVIL,ZESTRIL) 20 MG tablet    Other Relevant Orders    TSH (Completed)    CBC Auto Differential    Benign hypertension    Relevant Medications    lisinopril (PRINIVIL,ZESTRIL) 20 MG tablet       Endocrine    Type II diabetes mellitus (CMS/HCC) - Primary    Current Assessment & Plan     Improved, good control off metformin, discussed diet, exercise, follow-up recheck         Relevant Medications    metFORMIN (GLUCOPHAGE) 500 MG tablet    Other Relevant Orders    POC Glycosylated Hemoglobin (Hb A1C) (Completed)       Nervous and Auditory    Acute left-sided low back pain with right-sided sciatica    Current Assessment & Plan     Recurrent symptoms, history of lumbar surgery by 2, repeat  imaging, follow-up with Dr. Mark scheduled, positive left leg radiculopathy and weakness            Musculoskeletal and Integument    Lumbar disc disease    Overview     Worse, increasing pain, radiculopathy left lower extremity, recheck MRI, follow-up with Dr. Mark scheduled, OA left hip contributing Ortho referral scheduled         Osteoarthritis of left hip    Overview     Severe per x-ray, Ortho referral scheduled           Other Visit Diagnoses     Vitamin D deficiency        Relevant Orders    Vitamin D 25 Hydroxy (Completed)    Screening PSA (prostate specific antigen)        Relevant Orders    PSA Screen (Completed)            Expected course, medications, and adverse effects discussed.  Call or return if worsening or persistent symptoms.

## 2019-08-13 NOTE — ASSESSMENT & PLAN NOTE
Recurrent symptoms, history of lumbar surgery by 2, repeat imaging, follow-up with Dr. Mark scheduled, positive left leg radiculopathy and weakness

## 2019-08-17 LAB
25(OH)D3+25(OH)D2 SERPL-MCNC: 34.9 NG/ML (ref 30–100)
ALBUMIN SERPL-MCNC: 4.6 G/DL (ref 3.6–4.8)
ALBUMIN/GLOB SERPL: 1.8 {RATIO} (ref 1.2–2.2)
ALP SERPL-CCNC: 83 IU/L (ref 39–117)
ALT SERPL-CCNC: 33 IU/L (ref 0–44)
AST SERPL-CCNC: 25 IU/L (ref 0–40)
BASOPHILS # BLD AUTO: 0.1 X10E3/UL (ref 0–0.2)
BASOPHILS NFR BLD AUTO: 1 %
BILIRUB SERPL-MCNC: 0.6 MG/DL (ref 0–1.2)
BUN SERPL-MCNC: 23 MG/DL (ref 8–27)
BUN/CREAT SERPL: 24 (ref 10–24)
CALCIUM SERPL-MCNC: 9.8 MG/DL (ref 8.6–10.2)
CHLORIDE SERPL-SCNC: 102 MMOL/L (ref 96–106)
CHOLEST SERPL-MCNC: 169 MG/DL (ref 100–199)
CHOLEST/HDLC SERPL: 4.3 RATIO (ref 0–5)
CO2 SERPL-SCNC: 23 MMOL/L (ref 20–29)
CREAT SERPL-MCNC: 0.96 MG/DL (ref 0.76–1.27)
EOSINOPHIL # BLD AUTO: 0.3 X10E3/UL (ref 0–0.4)
EOSINOPHIL NFR BLD AUTO: 4 %
ERYTHROCYTE [DISTWIDTH] IN BLOOD BY AUTOMATED COUNT: 13.5 % (ref 12.3–15.4)
GLOBULIN SER CALC-MCNC: 2.6 G/DL (ref 1.5–4.5)
GLUCOSE SERPL-MCNC: 129 MG/DL (ref 65–99)
HCT VFR BLD AUTO: 45.9 % (ref 37.5–51)
HDLC SERPL-MCNC: 39 MG/DL
HGB BLD-MCNC: 15.3 G/DL (ref 13–17.7)
IMM GRANULOCYTES # BLD AUTO: 0 X10E3/UL (ref 0–0.1)
IMM GRANULOCYTES NFR BLD AUTO: 0 %
LDLC SERPL CALC-MCNC: 113 MG/DL (ref 0–99)
LYMPHOCYTES # BLD AUTO: 1.5 X10E3/UL (ref 0.7–3.1)
LYMPHOCYTES NFR BLD AUTO: 25 %
MCH RBC QN AUTO: 32 PG (ref 26.6–33)
MCHC RBC AUTO-ENTMCNC: 33.3 G/DL (ref 31.5–35.7)
MCV RBC AUTO: 96 FL (ref 79–97)
MONOCYTES # BLD AUTO: 0.4 X10E3/UL (ref 0.1–0.9)
MONOCYTES NFR BLD AUTO: 6 %
NEUTROPHILS # BLD AUTO: 3.7 X10E3/UL (ref 1.4–7)
NEUTROPHILS NFR BLD AUTO: 64 %
PLATELET # BLD AUTO: 264 X10E3/UL (ref 150–450)
POTASSIUM SERPL-SCNC: 4.7 MMOL/L (ref 3.5–5.2)
PROT SERPL-MCNC: 7.2 G/DL (ref 6–8.5)
PSA SERPL-MCNC: 0.4 NG/ML (ref 0–4)
RBC # BLD AUTO: 4.78 X10E6/UL (ref 4.14–5.8)
SODIUM SERPL-SCNC: 140 MMOL/L (ref 134–144)
TRIGL SERPL-MCNC: 83 MG/DL (ref 0–149)
TSH SERPL DL<=0.005 MIU/L-ACNC: 3.99 UIU/ML (ref 0.45–4.5)
VLDLC SERPL CALC-MCNC: 17 MG/DL (ref 5–40)
WBC # BLD AUTO: 5.9 X10E3/UL (ref 3.4–10.8)

## 2019-08-18 PROBLEM — M16.12 OSTEOARTHRITIS OF LEFT HIP: Status: ACTIVE | Noted: 2019-08-18

## 2019-08-19 ENCOUNTER — TELEPHONE (OUTPATIENT)
Dept: FAMILY MEDICINE CLINIC | Facility: CLINIC | Age: 64
End: 2019-08-19

## 2019-08-19 DIAGNOSIS — M51.9 LUMBAR DISC DISEASE: ICD-10-CM

## 2019-08-19 DIAGNOSIS — M54.41 ACUTE LEFT-SIDED LOW BACK PAIN WITH RIGHT-SIDED SCIATICA: Primary | ICD-10-CM

## 2019-08-19 DIAGNOSIS — M25.552 PAIN OF LEFT HIP JOINT: ICD-10-CM

## 2019-08-19 NOTE — TELEPHONE ENCOUNTER
----- Message from Charles Calderon MD sent at 8/13/2019  6:50 PM EDT -----  Let him know his x-ray of his hip does show that he has severe wear and tear arthritis in his left hip, will be reasonable to have him see an orthopedist to give him an opinion on that, set him up to see Dr. Arcos at [facility] who is a hip specialist, his x-ray does show severe wear and tear in his low back as expected, I still believe a good portion of his symptoms are coming from his low back, lets go ahead and get him scheduled for an MRI of his L-spine to further eval, thanks

## 2019-08-22 ENCOUNTER — TELEPHONE (OUTPATIENT)
Dept: FAMILY MEDICINE CLINIC | Facility: CLINIC | Age: 64
End: 2019-08-22

## 2019-08-23 ENCOUNTER — TELEPHONE (OUTPATIENT)
Dept: FAMILY MEDICINE CLINIC | Facility: CLINIC | Age: 64
End: 2019-08-23

## 2019-08-23 DIAGNOSIS — M25.552 PAIN OF LEFT HIP JOINT: Primary | ICD-10-CM

## 2019-08-23 NOTE — TELEPHONE ENCOUNTER
Called pt and notified him that he was put on the workqeue list and that they will call him next with his appt.

## 2019-08-27 ENCOUNTER — OFFICE VISIT (OUTPATIENT)
Dept: FAMILY MEDICINE CLINIC | Facility: CLINIC | Age: 64
End: 2019-08-27

## 2019-08-27 VITALS
HEART RATE: 89 BPM | DIASTOLIC BLOOD PRESSURE: 68 MMHG | WEIGHT: 218.8 LBS | TEMPERATURE: 99.1 F | SYSTOLIC BLOOD PRESSURE: 148 MMHG | RESPIRATION RATE: 18 BRPM | BODY MASS INDEX: 29.64 KG/M2 | OXYGEN SATURATION: 98 % | HEIGHT: 72 IN

## 2019-08-27 DIAGNOSIS — G47.33 OBSTRUCTIVE SLEEP APNEA: ICD-10-CM

## 2019-08-27 DIAGNOSIS — E11.9 TYPE 2 DIABETES MELLITUS WITHOUT COMPLICATION, WITHOUT LONG-TERM CURRENT USE OF INSULIN (HCC): ICD-10-CM

## 2019-08-27 DIAGNOSIS — M51.9 LUMBAR DISC DISEASE: ICD-10-CM

## 2019-08-27 DIAGNOSIS — I10 HYPERTENSION, BENIGN: ICD-10-CM

## 2019-08-27 DIAGNOSIS — L20.9 ATOPIC DERMATITIS, UNSPECIFIED TYPE: ICD-10-CM

## 2019-08-27 DIAGNOSIS — E78.2 MIXED HYPERLIPIDEMIA: ICD-10-CM

## 2019-08-27 DIAGNOSIS — Z12.11 COLON CANCER SCREENING: ICD-10-CM

## 2019-08-27 DIAGNOSIS — Z00.01 ENCOUNTER FOR ANNUAL GENERAL MEDICAL EXAMINATION WITH ABNORMAL FINDINGS IN ADULT: Primary | ICD-10-CM

## 2019-08-27 DIAGNOSIS — Z12.5 SCREENING PSA (PROSTATE SPECIFIC ANTIGEN): ICD-10-CM

## 2019-08-27 DIAGNOSIS — M16.12 PRIMARY OSTEOARTHRITIS OF LEFT HIP: ICD-10-CM

## 2019-08-27 DIAGNOSIS — G47.30 SLEEP APNEA, UNSPECIFIED TYPE: ICD-10-CM

## 2019-08-27 PROBLEM — S33.8XXA SPRAIN OF GROIN: Status: ACTIVE | Noted: 2019-08-27

## 2019-08-27 LAB
BILIRUB BLD-MCNC: NEGATIVE MG/DL
CLARITY, POC: CLEAR
COLOR UR: YELLOW
GLUCOSE UR STRIP-MCNC: NEGATIVE MG/DL
KETONES UR QL: NEGATIVE
LEUKOCYTE EST, POC: NEGATIVE
NITRITE UR-MCNC: NEGATIVE MG/ML
PH UR: 5 [PH] (ref 5–8)
PROT UR STRIP-MCNC: NEGATIVE MG/DL
RBC # UR STRIP: NEGATIVE /UL
SP GR UR: 1.01 (ref 1–1.03)
UROBILINOGEN UR QL: NORMAL

## 2019-08-27 PROCEDURE — 99213 OFFICE O/P EST LOW 20 MIN: CPT | Performed by: FAMILY MEDICINE

## 2019-08-27 PROCEDURE — 81002 URINALYSIS NONAUTO W/O SCOPE: CPT | Performed by: FAMILY MEDICINE

## 2019-08-27 PROCEDURE — 99396 PREV VISIT EST AGE 40-64: CPT | Performed by: FAMILY MEDICINE

## 2019-08-27 RX ORDER — CLOTRIMAZOLE AND BETAMETHASONE DIPROPIONATE 10; .64 MG/G; MG/G
CREAM TOPICAL 2 TIMES DAILY
Qty: 60 G | Refills: 12 | Status: SHIPPED | OUTPATIENT
Start: 2019-08-27 | End: 2019-09-30

## 2019-08-27 RX ORDER — PREDNISONE 1 MG/1
5 TABLET ORAL DAILY
Qty: 45 TABLET | Refills: 0 | Status: SHIPPED | OUTPATIENT
Start: 2019-08-27 | End: 2019-09-30

## 2019-09-01 PROBLEM — Z12.11 COLON CANCER SCREENING: Status: ACTIVE | Noted: 2019-09-01

## 2019-09-01 PROBLEM — Z12.5 SCREENING PSA (PROSTATE SPECIFIC ANTIGEN): Status: ACTIVE | Noted: 2019-09-01

## 2019-09-04 DIAGNOSIS — M54.41 ACUTE LEFT-SIDED LOW BACK PAIN WITH RIGHT-SIDED SCIATICA: ICD-10-CM

## 2019-09-04 DIAGNOSIS — M51.9 LUMBAR DISC DISEASE: ICD-10-CM

## 2019-09-07 DIAGNOSIS — M54.5 LOW BACK PAIN, UNSPECIFIED BACK PAIN LATERALITY, UNSPECIFIED CHRONICITY, WITH SCIATICA PRESENCE UNSPECIFIED: ICD-10-CM

## 2019-09-09 RX ORDER — GABAPENTIN 100 MG/1
CAPSULE ORAL
Qty: 90 CAPSULE | Refills: 0 | Status: SHIPPED | OUTPATIENT
Start: 2019-09-09 | End: 2019-09-30

## 2019-09-17 ENCOUNTER — OFFICE VISIT (OUTPATIENT)
Dept: ORTHOPEDIC SURGERY | Facility: CLINIC | Age: 64
End: 2019-09-17

## 2019-09-17 VITALS
WEIGHT: 215 LBS | DIASTOLIC BLOOD PRESSURE: 80 MMHG | BODY MASS INDEX: 30.1 KG/M2 | HEART RATE: 80 BPM | SYSTOLIC BLOOD PRESSURE: 179 MMHG | HEIGHT: 71 IN

## 2019-09-17 DIAGNOSIS — M16.12 PRIMARY OSTEOARTHRITIS OF LEFT HIP: Primary | ICD-10-CM

## 2019-09-17 DIAGNOSIS — M54.16 CHRONIC RADICULAR LUMBAR PAIN: ICD-10-CM

## 2019-09-17 DIAGNOSIS — G89.29 CHRONIC RADICULAR LUMBAR PAIN: ICD-10-CM

## 2019-09-17 PROCEDURE — 99204 OFFICE O/P NEW MOD 45 MIN: CPT | Performed by: ORTHOPAEDIC SURGERY

## 2019-09-17 RX ORDER — MELOXICAM 7.5 MG/1
15 TABLET ORAL ONCE
Status: CANCELLED | OUTPATIENT
Start: 2019-09-17 | End: 2019-09-17

## 2019-09-17 RX ORDER — OXYCODONE HCL 10 MG/1
10 TABLET, FILM COATED, EXTENDED RELEASE ORAL ONCE
Status: CANCELLED | OUTPATIENT
Start: 2019-09-17 | End: 2019-09-17

## 2019-09-17 RX ORDER — ACETAMINOPHEN 325 MG/1
1000 TABLET ORAL ONCE
Status: CANCELLED | OUTPATIENT
Start: 2019-09-17 | End: 2019-09-17

## 2019-09-17 NOTE — PROGRESS NOTES
NEW VISIT    Patient: Rudy Henriquez Jr.  ?  YOB: 1955    MRN: [id number]  ?  Chief Complaint   Patient presents with   • Left Hip - Initial Evaluation      ?  HPI: The patient is here for chronic ongoing pain from the lumbar spine as well as the left hip.  He states that he has had a lumbar spinal discectomy at L4-5 and L5-S1 by Dr. Parveen Mark in June 2018.  His radiculopathy seems to be better.  He still has a lot of pain in the low back.  Flexion and extension of the spine cause radiation of pain into the hip.  His pain in the left hip mostly affects his groin.  By the end of the day he is limping significantly.  He finds it very difficult to put on his shoes and socks.  He has the feeling that he has shortening of the left lower extremity and the limb length discrepancy is affecting his quality of life in a negative fashion.  There are no risk factors for avascular necrosis.  He denies any history of trauma to the left hip.  He has had anti-inflammatory medication and short bursts of steroid in the past to help control his symptoms to some degree.  Pain Location: Left hip and groin.  Radiation: none  Quality: sharp, stabbing  Intensity/Severity: severe: He states his pain is 7 on a scale of 1-10 when it is at its worst.  Duration: For the past 2 years or so, worse over the past 3 to 4 months.  Onset quality: gradual   Timing: constant  Aggravating Factors: Flexion of the hip and going up and down the steps as well as turning over in bed at night.  Alleviating Factors: Anti-inflammatory medication and resting the hip.  Previous Episodes: yes  Associated Symptoms: pain, swelling, decreased strength  Previous Treatment: Anti-inflammatory medication and home-based exercise program.    This patient is a new patient.  This problem is new to this examiner.      Allergies:   Allergies   Allergen Reactions   • Povidone Iodine Rash       Medications:   Home Medications:  Current Outpatient Medications on  File Prior to Visit   Medication Sig   • aspirin 81 MG chewable tablet Chew 81 mg Daily.   • clotrimazole-betamethasone (LOTRISONE) 1-0.05 % cream Apply  topically to the appropriate area as directed 2 (Two) Times a Day.   • cyclobenzaprine (FLEXERIL) 10 MG tablet Take 0.5-1 tablets by mouth Daily.   • gabapentin (NEURONTIN) 100 MG capsule TAKE 2 CAPSULES BY MOUTH 3 TIMES A DAY   • gabapentin (NEURONTIN) 100 MG capsule TAKE 3 CAPSULES BY MOUTH EVERY DAY   • lisinopril (PRINIVIL,ZESTRIL) 20 MG tablet Take 1 tablet by mouth Daily.   • meloxicam (MOBIC) 15 MG tablet TAKE 1 TABLET BY MOUTH EVERY DAY   • Multiple Vitamins-Minerals (MULTIVITAMIN ADULT PO) Take 1 tablet by mouth Daily.   • Nystatin powder Apply 1 application topically to the appropriate area as directed 2 (Two) Times a Day As Needed.   • predniSONE (DELTASONE) 5 MG tablet Take 1 tablet by mouth Daily. 40mg x 3 days, 20mg x 3 days, 10mg x 3 days, 5mg x 3 days   • tiZANidine (ZANAFLEX) 4 MG tablet Take 0.5-1 tablets by mouth At Night As Needed.     No current facility-administered medications on file prior to visit.      Current Medications:  Scheduled Meds:  PRN Meds:.    I have reviewed the patient's medical history in detail and updated the computerized patient record.  Review and summarization of old records include:    Past Medical History:   Diagnosis Date   • Acute reaction to stress    • Asthma    • Benign hypertension    • Hyperlipemia    • Sleep apnea    • Tremor    • Type 2 diabetes mellitus (CMS/[facility])      Past Surgical History:   Procedure Laterality Date   • BACK SURGERY  06/07/2018    on L4 and L5 by dr mark   • BACK SURGERY  07/13/2017    L4-L5 Dr. Mark Widejaqueline spinal column   • ORIF CALCANEAL FRACTURE Left 05/2018    [facility]   • TONSILLECTOMY AND ADENOIDECTOMY      at age 48     Social History     Occupational History   • Not on file   Tobacco Use   • Smoking status: Never Smoker   • Smokeless tobacco: Never Used   Substance and Sexual  "Activity   • Alcohol use: No     Frequency: Never   • Drug use: No   • Sexual activity: Defer     Partners: Female     Comment:       Social History     Social History Narrative   • Not on file     Family History   Problem Relation Age of Onset   • Colon cancer Mother    • Lung cancer Father    • Diabetes Sister          Review of Systems   Constitutional: Negative.    HENT: Negative.    Eyes: Negative.    Respiratory: Negative.    Cardiovascular: Negative.    Gastrointestinal: Negative.    Endocrine: Negative.    Genitourinary: Negative.    Musculoskeletal: Positive for arthralgias and gait problem.   Skin: Negative.    Allergic/Immunologic: Negative.    Hematological: Negative.    Psychiatric/Behavioral: Negative.           Wt Readings from Last 3 Encounters:   09/17/19 97.5 kg (215 lb)   08/27/19 99.2 kg (218 lb 12.8 oz)   08/13/19 98.2 kg (216 lb 9.6 oz)     Ht Readings from Last 3 Encounters:   09/17/19 180.3 cm (71\")   08/27/19 181.6 cm (71.5\")   08/13/19 181.6 cm (71.5\")     Body mass index is 29.99 kg/m².  Facility age limit for growth percentiles is 20 years.  Vitals:    09/17/19 1234   BP: 179/80   Pulse: 80         Physical Exam   Constitutional: Patient is oriented to person, place, and time. Appears well-developed and well-nourished.   HENT:   Head: Normocephalic and atraumatic.   Eyes: Conjunctivae and EOM are normal. Pupils are equal, round, and reactive to light.   Cardiovascular: Normal rate, regular rhythm, normal heart sounds and intact distal pulses.   Pulmonary/Chest: Effort normal and breath sounds normal.   Musculoskeletal:   See detailed exam below   Neurological: Alert and oriented to person, place, and time. No sensory deficit. Coordination normal.   Skin: Skin is warm and dry. Capillary refill takes less than 2 seconds. No rash noted. No erythema.   Psychiatric: Patient has a normal mood and affect. His behavior is normal. Judgment and thought content normal.   Nursing note and " vitals reviewed.      Ortho Exam:   Left hip (gtb). Skin and soft tissues over the greater trochanteric bursa are painful and tender for the patient. Neurovascular status is intact. IT band is painful and tender. Cross body adduction bothers the patient significantly. Internal and external rotations bother the patient significantly with tenderness over the greater trochanter. There is no clinical deformity. No shortening. The patient does have a significant limp because by the trochanteric pain caused by the abductors. The piriformis is tight and with any rotation there is significant capsular tightness. Dorsalis pedis and posterior tibial artery pulses are palpable. Capillary refill is 2 seconds with a brisk return. Common peroneal nerve function is well preserved.  Left hip (djd). Neurovascular status is intact. Patient does have a limp on the affected side. Internal and external rotations are associated with pain and discomfort. Anterior joint line pain and tenderness is significant. Stinchfield sign is positive. Figure of 4 sign is positive. Patient is unable to perform an active straight leg raise exam. Greater trochanter is tender. Crossover adduction test is positive. Cross body adduction is limited and painful for the patient. Patient has very significant limp and joint line tenderness anteriorly, posteriorly and medially. Dorsalis pedis and posterior tibial artery pulses are palpable. Common peroneal nerve function is well preserved.       Lumbar Spine: The overlying skin and soft tissues are mildly swollen. Deep tendon reflexes are bilaterally, symmetric and equal.  No long tract signs are noted. There is No evidence of myelopathy. No bowel or bladder deficit noted. Mild spasm of erector spinae muscle is noted. bilaterally sided rotation is associated with pain and discomfort. Straight leg raise test is positive to 60 degrees. Contralateral straight leg raise is negative. Pain radiates to buttock and  thigh. Get up and go is slow due to the lumbar pain.No objective motor or sensory function loss is noted.   Diagnostics:bilateral Hip X-Ray  Indication: Pain in the left hip.  AP and lateral  Findings: Severe degenerative change in the left femoral head.  Complete loss of joint space with bone-on-bone appearance.  Subchondral cyst formation noted over the femoral head.  Possibility of subchondral bone fracture cannot be ruled out.  There is an osteophyte over the lateral aspect of the acetabulum.  This hip x-ray reveals end-stage hip disease.  no bony lesion  Soft tissues within normal limits  decreased joint spaces  Hardware appropriately positioned not applicable      no prior studies available for comparison.    X-RAY was ordered and reviewed by Sarbjit Arcos MD    Lumbar spine MRIs are evaluated and discussed with the patient and his wife.  These images show that the patient has advancing degenerative changes at L4-5 and L5-S1.  There is listhesis in the lower lumbar spine.  There is a broad-based disc bulge with severe central canal stenosis starting at the L3 level going all the way up to the S1 level.  The patient has been guided by me to talk to his neurosurgeon for possible intervention at the L3-4 level and possibly discussion regarding fusion surgery in the lower lumbar spine for advancing symptoms.       Assessment:  Rudy was seen today for initial evaluation.    Diagnoses and all orders for this visit:    Primary osteoarthritis of left hip    Chronic radicular lumbar pain          Procedures  ?    Plan    · Okay to use a cane for assistance with ambulation.  · Tablet meloxicam 15 mg tab 1 p.o. nightly for pain swelling and discomfort.  · Intra-articular steroid injection discussed with the patient and that we will have to be set up at the radiology department at the hospital.  The patient will call me back when he is ready to have that injection performed.  · Discussed issues with regards to limb  length discrepancy with the patient.  · [employer] exercise program with stretching and strengthening exercises of the hip and the lumbar spine.  · Risks and benefits and rationale for surgical replacement of the left hip discussed with the patient and his wife.  Time spent in the office today with the patient is 45 minutes of which greater than 50% of my time was spent face-to-face with the patient going through the treatment options and the rationale for surgical intervention.  The patient was seen today for preoperative discussion.  The patient has been tried on over-the-counter and prescription NSAID's despite the risks of anti-inflammatory bleeding, peptic ulcers and erosive gastritis with short term benefit only.  Braces have been prescribed for mechanical support.  Patient has been participating in an exercise program specifically targeting joint pain relief with limited benefit. Intraarticular injections have been used periodically with some but not complete relief of pain.  Ambulation aids have also been utilized.      · The details of the surgical procedure were explained including the location of probable incisions and a description of the likely hardware/grafts to be used. The patient understands the likely convalescence after surgery as well as the rehabilitation required.  Also, we have thoroughly discussed with the patient the risks, benefits and alternatives to surgery.  Risks include but are not limited to the risk of infection, joint stiffness, limited range of motion, wound healing problems, scar tissue build up, myocardial infarction, stroke, blood clots (including DVT and/or pulmonary embolus along with the risk of death) neurologic and/or vascular injury, limb length discrepancy, fracture, dislocation, nonunion, malunion, continued pain and need for further surgery including hardware failure requiring revision.   · Rest, ice, compression, and elevation (RICE) therapy  · Stretching and  strengthening exercises  · OTC Tylenol 500-1000mg by mouth every 6 hours as needed for pain   · Follow up in 3 month(s) and the patient will call me back if he decides to have an intra-articular steroid injection or if he would like to proceed with hip replacement surgery.  · In the meantime, he will consult with his neurosurgical specialist to see if he is a candidate for either epidural blocks at the pain clinic or further spinal surgical intervention.    Sarbjit Arcos MD  09/17/2019

## 2019-09-25 ENCOUNTER — TELEPHONE (OUTPATIENT)
Dept: FAMILY MEDICINE CLINIC | Facility: CLINIC | Age: 64
End: 2019-09-25

## 2019-09-30 ENCOUNTER — OFFICE VISIT (OUTPATIENT)
Dept: NEUROLOGY | Facility: CLINIC | Age: 64
End: 2019-09-30

## 2019-09-30 VITALS
DIASTOLIC BLOOD PRESSURE: 78 MMHG | SYSTOLIC BLOOD PRESSURE: 123 MMHG | WEIGHT: 215.8 LBS | BODY MASS INDEX: 30.9 KG/M2 | HEART RATE: 80 BPM | HEIGHT: 70 IN

## 2019-09-30 DIAGNOSIS — E66.9 OBESITY (BMI 30-39.9): ICD-10-CM

## 2019-09-30 DIAGNOSIS — G47.33 OBSTRUCTIVE SLEEP APNEA: Primary | ICD-10-CM

## 2019-09-30 PROCEDURE — 99213 OFFICE O/P EST LOW 20 MIN: CPT | Performed by: PSYCHIATRY & NEUROLOGY

## 2019-09-30 NOTE — PROGRESS NOTES
Sleep medicine follow-up visit    Rudy Henriquez Jr.   1955  64 y.o. male   DATE OF SERVICE: 9/30/2019     Yearly f/u for CPAP compliance, patient doing well with pap therapy. Patient  uses a full face mask and been getting his supplies on line.     Pt uses cpap nightly 6-8 hours. (has old machine for travel)      On NPSG at [facility] , 02/07/2008 patient had Severe obstructive sleep apnea syndrome with apnea-hypopnea index of 34.8 per sleep hour, minimum SpO2 of 82%    The compliance data   The patient feels great and is benefiting from it and is compliant.     Review of Systems   Constitutional: Negative for appetite change and fatigue.   HENT: Negative for sinus pressure and sinus pain.    Eyes: Negative for pain and itching.   Respiratory: Negative for cough and shortness of breath.    Cardiovascular: Negative for chest pain and palpitations.   Gastrointestinal: Negative for constipation and diarrhea.   Endocrine: Negative for cold intolerance and heat intolerance.   Genitourinary: Negative for difficulty urinating and frequency.   Musculoskeletal: Positive for back pain. Negative for neck pain.   Allergic/Immunologic: Negative for environmental allergies.   Neurological: Positive for numbness. Negative for dizziness, tremors, seizures, syncope, facial asymmetry, speech difficulty, weakness, light-headedness and headaches.   Psychiatric/Behavioral: Negative for agitation and confusion.     I reviewed and addressed ROS entered by MA.      Current Outpatient Medications:   •  aspirin 81 MG chewable tablet, Chew 81 mg Daily., Disp: , Rfl:   •  gabapentin (NEURONTIN) 100 MG capsule, TAKE 2 CAPSULES BY MOUTH 3 TIMES A DAY, Disp: 180 capsule, Rfl: 2  •  lisinopril (PRINIVIL,ZESTRIL) 20 MG tablet, Take 1 tablet by mouth Daily., Disp: , Rfl:   •  meloxicam (MOBIC) 15 MG tablet, TAKE 1 TABLET BY MOUTH EVERY DAY, Disp: 90 tablet, Rfl: 4  •  Multiple Vitamins-Minerals (MULTIVITAMIN ADULT PO), Take 1 tablet by mouth Daily.,  Disp: , Rfl:   •  Nystatin powder, Apply 1 application topically to the appropriate area as directed 2 (Two) Times a Day As Needed., Disp: , Rfl:   Allergies   Allergen Reactions   • Povidone Iodine Rash        PHYSICAL EXAMINATION:  Vitals:    09/30/19 1528   BP: 123/78   Pulse: 80      Body mass index is 30.96 kg/m².       HEENT: Normal.    EXTREMITIES: No edema.     IMPRESSION:     Patient with obstructive sleep apnea syndrome with hypersomnia successfully treated with CPAP therapy and is compliant and benefiting from it.     Machine is 6 years old but working well,   Will obtain hst to document severity of nathan    Pt encouraged to lose weight      RECOMMENDATIONS:   1. Continue present CPAP.   2. Follow up 1 year.     EPWORTH SLEEPINESS SCALE  Sitting and reading  0  WatchingTV  0  Sitting, inactive, in a public place  0  As a passenger in a car for 1 hour w/o a break  0  Lying down to rest in the afternoon  0  Sitting and talking to someone  0  Sitting quietly after a lunch  0  In a car, while stopped for traffic or a light  0  Total 0          This document has been electronically signed by Joseph Seipel, MD on September 30, 2019 3:57 PM

## 2019-10-02 DIAGNOSIS — M54.50 LOW BACK PAIN: ICD-10-CM

## 2019-10-02 RX ORDER — GABAPENTIN 100 MG/1
200 CAPSULE ORAL 3 TIMES DAILY
Qty: 180 CAPSULE | Refills: 2 | Status: SHIPPED | OUTPATIENT
Start: 2019-10-02 | End: 2019-11-12

## 2019-10-04 ENCOUNTER — TELEPHONE (OUTPATIENT)
Dept: ORTHOPEDIC SURGERY | Facility: CLINIC | Age: 64
End: 2019-10-04

## 2019-10-04 DIAGNOSIS — M16.12 PRIMARY OSTEOARTHRITIS OF LEFT HIP: Primary | ICD-10-CM

## 2019-10-04 NOTE — TELEPHONE ENCOUNTER
Returned call to Pt inquiring about Appt with Dr Arcos   · Intra-articular steroid injection discussed with the patient and that we will have to be set up at the radiology department at the hospital.  The patient will call me back when he is ready to have that injection performed.    Informed that order would be put in and t hat he would be hearing from [facility]

## 2019-10-09 DIAGNOSIS — I10 HYPERTENSION, BENIGN: Primary | ICD-10-CM

## 2019-10-10 RX ORDER — LISINOPRIL 20 MG/1
TABLET ORAL
Qty: 90 TABLET | Refills: 3 | Status: SHIPPED | OUTPATIENT
Start: 2019-10-10 | End: 2020-05-01 | Stop reason: CLARIF

## 2019-10-18 ENCOUNTER — HOSPITAL ENCOUNTER (OUTPATIENT)
Dept: INTERVENTIONAL RADIOLOGY/VASCULAR | Facility: HOSPITAL | Age: 64
Discharge: HOME | End: 2019-10-18
Admitting: RADIOLOGY

## 2019-10-18 DIAGNOSIS — M16.12 PRIMARY OSTEOARTHRITIS OF LEFT HIP: ICD-10-CM

## 2019-10-18 PROCEDURE — 77002 NEEDLE LOCALIZATION BY XRAY: CPT

## 2019-10-18 PROCEDURE — 25010000002 METHYLPREDNISOLONE PER 40 MG: Performed by: ORTHOPAEDIC SURGERY

## 2019-10-18 PROCEDURE — 0 IOPAMIDOL PER 1 ML: Performed by: ORTHOPAEDIC SURGERY

## 2019-10-18 RX ORDER — METHYLPREDNISOLONE ACETATE 40 MG/ML
80 INJECTION, SUSPENSION INTRA-ARTICULAR; INTRALESIONAL; INTRAMUSCULAR; SOFT TISSUE ONCE
Status: COMPLETED | OUTPATIENT
Start: 2019-10-18 | End: 2019-10-18

## 2019-10-18 RX ORDER — BUPIVACAINE HYDROCHLORIDE 2.5 MG/ML
3 INJECTION, SOLUTION EPIDURAL; INFILTRATION; INTRACAUDAL ONCE
Status: COMPLETED | OUTPATIENT
Start: 2019-10-18 | End: 2019-10-18

## 2019-10-18 RX ADMIN — BUPIVACAINE HYDROCHLORIDE 3 ML: 2.5 INJECTION, SOLUTION EPIDURAL; INFILTRATION; INTRACAUDAL; PERINEURAL at 08:45

## 2019-10-18 RX ADMIN — IOPAMIDOL 1 ML: 755 INJECTION, SOLUTION INTRAVENOUS at 08:45

## 2019-10-18 RX ADMIN — METHYLPREDNISOLONE ACETATE 80 MG: 40 INJECTION, SUSPENSION INTRA-ARTICULAR; INTRALESIONAL; INTRAMUSCULAR; SOFT TISSUE at 08:45

## 2019-10-31 DIAGNOSIS — G47.33 OBSTRUCTIVE SLEEP APNEA: Primary | ICD-10-CM

## 2019-11-07 ENCOUNTER — HOSPITAL ENCOUNTER (OUTPATIENT)
Dept: SLEEP MEDICINE | Facility: HOSPITAL | Age: 64
Discharge: HOME | End: 2019-11-07
Admitting: PSYCHIATRY & NEUROLOGY

## 2019-11-07 VITALS — BODY MASS INDEX: 29.35 KG/M2 | HEIGHT: 70 IN | WEIGHT: 205 LBS

## 2019-11-07 DIAGNOSIS — G47.33 OBSTRUCTIVE SLEEP APNEA: ICD-10-CM

## 2019-11-07 PROCEDURE — 95806 SLEEP STUDY UNATT&RESP EFFT: CPT

## 2019-11-12 ENCOUNTER — OFFICE VISIT (OUTPATIENT)
Dept: ORTHOPEDIC SURGERY | Facility: CLINIC | Age: 64
End: 2019-11-12

## 2019-11-12 VITALS
DIASTOLIC BLOOD PRESSURE: 89 MMHG | SYSTOLIC BLOOD PRESSURE: 152 MMHG | WEIGHT: 211 LBS | HEART RATE: 69 BPM | HEIGHT: 69 IN | BODY MASS INDEX: 31.25 KG/M2

## 2019-11-12 DIAGNOSIS — M51.9 LUMBAR DISC DISEASE: Primary | ICD-10-CM

## 2019-11-12 DIAGNOSIS — M16.12 PRIMARY OSTEOARTHRITIS OF LEFT HIP: ICD-10-CM

## 2019-11-12 PROCEDURE — 99214 OFFICE O/P EST MOD 30 MIN: CPT | Performed by: ORTHOPAEDIC SURGERY

## 2019-11-12 RX ORDER — MELOXICAM 7.5 MG/1
15 TABLET ORAL ONCE
Status: CANCELLED | OUTPATIENT
Start: 2019-11-12 | End: 2019-11-12

## 2019-11-12 RX ORDER — OXYCODONE HCL 10 MG/1
10 TABLET, FILM COATED, EXTENDED RELEASE ORAL ONCE
Status: CANCELLED | OUTPATIENT
Start: 2019-11-12 | End: 2019-11-12

## 2019-11-12 RX ORDER — ACETAMINOPHEN 325 MG/1
1000 TABLET ORAL ONCE
Status: CANCELLED | OUTPATIENT
Start: 2019-11-12 | End: 2019-11-12

## 2019-11-12 NOTE — PROGRESS NOTES
FOLLOW UP VISIT    Patient: Rudy Henriquez Jr.  ?  YOB: 1955    MRN: [id number]  ?  Chief Complaint   Patient presents with   • Left Hip - Follow-up      ?  HPI: The patient follows up on left hip pain and discomfort.  He has an increasing limp on the left side.  He has been seen by Dr. Alfredo Mark neurosurgeon who has told him that his lumbar spine pathology is to be managed nonoperatively.  He has had a prior spinal discectomy and at this point a fusion is not indicated.  He underwent an intra-articular injection to the hip joint in the radiology department.  He states that the hip pain was relieved 80% by the hip injection intra-articularly.  This relief of pain lasted at least 3 to 4 weeks.  His pain went down from an 8/2/2004 after the hip injection.  He has had pre-existing nerve damage from a lumbar spinal surgery.  This has affected both his lower extremities more so on the left side.  The patient has had episodes of weakness and atrophy on the left side which accentuates the hip pain and pathology.  He is unable to flex his hip and put on his shoes and socks because of the pain and discomfort.  He states that he is trying to lose weight but is unable to do so because of the hip pathology which limits his exercise potential.  He would most certainly like to proceed with hip replacement surgery in the near future.  Pain Location: Lumbar spine with radiation to the anterior aspect of the left hip.  Radiation: none  Quality: dull, aching  Intensity/Severity: severe: He states that the pain is 8 on a scale of 1-10.  Duration: 2 years worse over the past 4 months.  Onset quality: gradual   Timing: constant  Aggravating Factors: Going up and down the steps and standing on a concrete floor for a prolonged period of time.  Alleviating Factors: Using anti-inflammatory medication and a home exercise program.  Previous Episodes: yes  Associated Symptoms: pain, swelling, decreased ROM, decreased  strength, numbness/tingling  Previous Treatment: Intra-articular injection under radiographic guidance with a steroid into the hip.    This patient is an established patient.  This problem is not new to this examiner.      Allergies:   Allergies   Allergen Reactions   • Povidone Iodine Rash       Medications:   Home Medications:  Current Outpatient Medications on File Prior to Visit   Medication Sig   • aspirin 81 MG chewable tablet Chew 81 mg Daily.   • lisinopril (PRINIVIL,ZESTRIL) 20 MG tablet TAKE 1 TABLET BY MOUTH EVERY DAY   • meloxicam (MOBIC) 15 MG tablet TAKE 1 TABLET BY MOUTH EVERY DAY   • Multiple Vitamins-Minerals (MULTIVITAMIN ADULT PO) Take 1 tablet by mouth Daily.   • Nystatin powder Apply 1 application topically to the appropriate area as directed 2 (Two) Times a Day As Needed.   • [DISCONTINUED] gabapentin (NEURONTIN) 100 MG capsule Take 2 capsules by mouth 3 (Three) Times a Day.     No current facility-administered medications on file prior to visit.      Current Medications:  Scheduled Meds:  PRN Meds:.    I have reviewed the patient's medical history in detail and updated the computerized patient record.  Review and summarization of old records include:    Past Medical History:   Diagnosis Date   • Acute reaction to stress    • Asthma    • Benign hypertension    • Hyperlipemia    • Sleep apnea    • Tremor    • Type 2 diabetes mellitus (CMS/[facility])      Past Surgical History:   Procedure Laterality Date   • BACK SURGERY  06/07/2018    on L4 and L5 by dr mark   • BACK SURGERY  07/13/2017    L4-L5 Dr. Mark Widejaqueline spinal column   • ORIF CALCANEAL FRACTURE Left 05/2018    [facility]   • TONSILLECTOMY AND ADENOIDECTOMY      at age 48     Social History     Occupational History   • Not on file   Tobacco Use   • Smoking status: Never Smoker   • Smokeless tobacco: Never Used   Substance and Sexual Activity   • Alcohol use: No     Frequency: Never   • Drug use: No   • Sexual activity: Defer     Partners:  "Female     Comment:       Social History     Social History Narrative   • Not on file     Family History   Problem Relation Age of Onset   • Colon cancer Mother    • Lung cancer Father    • Diabetes Sister          Review of Systems   Constitutional: Negative.    HENT: Negative.    Eyes: Negative.    Respiratory: Negative.    Cardiovascular: Negative.    Gastrointestinal: Negative.    Endocrine: Negative.    Genitourinary: Negative.    Musculoskeletal: Positive for arthralgias and gait problem.   Skin: Negative.    Allergic/Immunologic: Negative.    Hematological: Negative.    Psychiatric/Behavioral: Negative.           Wt Readings from Last 3 Encounters:   11/12/19 95.7 kg (211 lb)   11/07/19 93 kg (205 lb)   09/30/19 97.9 kg (215 lb 12.8 oz)     Ht Readings from Last 3 Encounters:   11/12/19 175.3 cm (69\")   11/07/19 177.8 cm (70\")   09/30/19 177.8 cm (70\")     Body mass index is 31.16 kg/m².  Facility age limit for growth percentiles is 20 years.  Vitals:    11/12/19 1349   BP: 152/89   Pulse: 69         Physical Exam   Constitutional: Patient is oriented to person, place, and time. Appears well-developed and well-nourished.   HENT:   Head: Normocephalic and atraumatic.   Eyes: Conjunctivae and EOM are normal. Pupils are equal, round, and reactive to light.   Cardiovascular: Normal rate, regular rhythm, normal heart sounds and intact distal pulses.   Pulmonary/Chest: Effort normal and breath sounds normal.   Musculoskeletal:   See detailed exam below   Neurological: Alert and oriented to person, place, and time. No sensory deficit. Coordination normal.   Skin: Skin is warm and dry. Capillary refill takes less than 2 seconds. No rash noted. No erythema.   Psychiatric: Patient has a normal mood and affect. His behavior is normal. Judgment and thought content normal.   Nursing note and vitals reviewed.      Ortho Exam:   Left hip (gtb). Skin and soft tissues over the greater trochanteric bursa are painful and " tender for the patient. Neurovascular status is intact. IT band is painful and tender. Cross body adduction bothers the patient significantly. Internal and external rotations bother the patient significantly with tenderness over the greater trochanter. There is no clinical deformity. No shortening. The patient does have a significant limp because by the trochanteric pain caused by the abductors. The piriformis is tight and with any rotation there is significant capsular tightness. Dorsalis pedis and posterior tibial artery pulses are palpable. Capillary refill is 2 seconds with a brisk return. Common peroneal nerve function is well preserved.  Left hip (djd). Neurovascular status is intact. Patient does have a limp on the affected side. Internal and external rotations are associated with pain and discomfort. Anterior joint line pain and tenderness is significant. Stinchfield sign is positive. Figure of 4 sign is positive. Patient is unable to perform an active straight leg raise exam. Greater trochanter is tender. Crossover adduction test is positive. Cross body adduction is limited and painful for the patient. Patient has very significant limp and joint line tenderness anteriorly, posteriorly and medially. Dorsalis pedis and posterior tibial artery pulses are palpable. Common peroneal nerve function is well preserved.     Left SI JOINT: Mild tenderness is present dorsally over the SI joint. Figure of 4 sign is positive. There is mild spasm present in the erector spinae muscle. Flexion and extension are associated with discomfort. Deep tendon reflexes are intact and symmetrical on both lower extremities. Pain radiates into the buttock on extension of the hip. No evidence of cauda equina syndrome. No motor or sensory deficit is noted. There is no bowel or bladder involvement.      Diagnostics: THe recently obtained x-rays are reviewed.  There is bone-on-bone appearance of the hip joint.  Subchondral cyst formation is  noted in the femoral head.  There is a lateral osteophyte over the acetabulum.  Joint space is completely obliterated.  These images are correlated with the patient's clinical findings and are the basis of my recommendation to proceed with hip replacement surgery.  These changes are consistent with severe osteoarthritis of the hip.       Assessment:  Rudy was seen today for follow-up.    Diagnoses and all orders for this visit:    Lumbar disc disease    Primary osteoarthritis of left hip          Procedures  ?    Plan    · Use a cane for assistance with ambulation.  · [employer] back [employer] exercise program with stretching and strengthening exercises of the lumbar spine muscles.  · Calcium and vitamin D for bone health.  · Extensive amount of time spent in the office today with the patient and his wife to the extent of 45 minutes.  Greater than 50% of my time was spent face-to-face with the patient going over the recent benefits of hip replacement surgery and the pros and cons of the surgical intervention.  Specifically we discussed the neurological deficit which has previously occurred from the lumbar spinal surgery.  That neurological deficit and the muscle atrophy are not going to recover from hip replacement surgery and I was very clear about that.  The patient was seen today for preoperative discussion.  The patient has been tried on over-the-counter and prescription NSAID's despite the risks of anti-inflammatory bleeding, peptic ulcers and erosive gastritis with short term benefit only.  Braces have been prescribed for mechanical support.  Patient has been participating in an exercise program specifically targeting joint pain relief with limited benefit. Intraarticular injections have been used periodically with some but not complete relief of pain.  Ambulation aids have also been utilized.      · The details of the surgical procedure were explained including the location of probable incisions and a  description of the likely hardware/grafts to be used. The patient understands the likely convalescence after surgery as well as the rehabilitation required.  Also, we have thoroughly discussed with the patient the risks, benefits and alternatives to surgery.  Risks include but are not limited to the risk of infection, joint stiffness, limited range of motion, wound healing problems, scar tissue build up, myocardial infarction, stroke, blood clots (including DVT and/or pulmonary embolus along with the risk of death) neurologic and/or vascular injury, limb length discrepancy, fracture, dislocation, nonunion, malunion, continued pain and need for further surgery including hardware failure requiring revision.   · Rest, ice, compression, and elevation (RICE) therapy  · Stretching and strengthening exercises  · OTC Tylenol 500-1000mg by mouth every 6 hours as needed for pain   · Follow up in 6 week(s)    Sarbjit Arcos MD  11/12/2019

## 2019-11-18 PROBLEM — M16.12 PRIMARY OSTEOARTHRITIS OF LEFT HIP: Status: ACTIVE | Noted: 2019-11-18

## 2019-11-18 PROCEDURE — 95806 SLEEP STUDY UNATT&RESP EFFT: CPT | Performed by: PSYCHIATRY & NEUROLOGY

## 2019-11-22 ENCOUNTER — TELEPHONE (OUTPATIENT)
Dept: FAMILY MEDICINE CLINIC | Facility: CLINIC | Age: 64
End: 2019-11-22

## 2019-11-25 NOTE — TELEPHONE ENCOUNTER
Lets get him scheduled back in to recheck his blood pressure and getting cleared for his upcoming hip surgery, thanks

## 2019-11-25 NOTE — TELEPHONE ENCOUNTER
Called pt and notified him of the message and he said that his surgery has been post poned due to FMLA until after March he said that when he comes in February he will discuss his BP and DM than.

## 2019-11-26 ENCOUNTER — TELEPHONE (OUTPATIENT)
Dept: NEUROLOGY | Facility: CLINIC | Age: 64
End: 2019-11-26

## 2019-11-26 DIAGNOSIS — G47.33 OBSTRUCTIVE SLEEP APNEA: Primary | ICD-10-CM

## 2019-12-02 ENCOUNTER — APPOINTMENT (OUTPATIENT)
Dept: PREADMISSION TESTING | Facility: HOSPITAL | Age: 64
End: 2019-12-02

## 2019-12-20 NOTE — TELEPHONE ENCOUNTER
Pt called and is requesting a knee xray of his left knee he is having severe pain.  He states that he is having surgery on March 11 for his hip with Dr. Jane.  Pt wants to know does he need appt with you to have an order for the xray?

## 2019-12-30 ENCOUNTER — OFFICE VISIT (OUTPATIENT)
Dept: FAMILY MEDICINE CLINIC | Facility: CLINIC | Age: 64
End: 2019-12-30

## 2019-12-30 VITALS
TEMPERATURE: 97.7 F | SYSTOLIC BLOOD PRESSURE: 138 MMHG | RESPIRATION RATE: 16 BRPM | OXYGEN SATURATION: 96 % | BODY MASS INDEX: 31.87 KG/M2 | WEIGHT: 215.2 LBS | HEIGHT: 69 IN | HEART RATE: 86 BPM | DIASTOLIC BLOOD PRESSURE: 80 MMHG

## 2019-12-30 DIAGNOSIS — E11.9 TYPE 2 DIABETES MELLITUS WITHOUT COMPLICATION, WITHOUT LONG-TERM CURRENT USE OF INSULIN (HCC): ICD-10-CM

## 2019-12-30 DIAGNOSIS — M16.12 PRIMARY OSTEOARTHRITIS OF LEFT HIP: ICD-10-CM

## 2019-12-30 DIAGNOSIS — E66.3 OVERWEIGHT: ICD-10-CM

## 2019-12-30 DIAGNOSIS — I10 HYPERTENSION, BENIGN: ICD-10-CM

## 2019-12-30 DIAGNOSIS — M25.562 ACUTE PAIN OF LEFT KNEE: ICD-10-CM

## 2019-12-30 DIAGNOSIS — R07.9 CHEST PAIN, UNSPECIFIED TYPE: ICD-10-CM

## 2019-12-30 DIAGNOSIS — M25.552 PAIN OF LEFT HIP JOINT: Primary | ICD-10-CM

## 2019-12-30 PROCEDURE — 99214 OFFICE O/P EST MOD 30 MIN: CPT | Performed by: FAMILY MEDICINE

## 2019-12-30 RX ORDER — GABAPENTIN 100 MG/1
CAPSULE ORAL
COMMUNITY
Start: 2019-12-20 | End: 2020-01-12

## 2019-12-30 NOTE — PROGRESS NOTES
Subjective   Rudy Henriquez Jr. is a 64 y.o. male.     Chief Complaint   Patient presents with   • Leg Pain       Leg Pain    The incident occurred more than 1 week ago. There was no injury mechanism. The pain is present in the left hip and left knee. The quality of the pain is described as stabbing. The pain has been constant since onset. He reports no foreign bodies present. The symptoms are aggravated by weight bearing and movement. He has tried acetaminophen for the symptoms. The treatment provided no relief.            I personally reviewed and updated the patient's allergies, medications, problem list, and past medical, surgical, social, and family history.     Family History   Problem Relation Age of Onset   • Colon cancer Mother    • Lung cancer Father    • Diabetes Sister        Social History     Tobacco Use   • Smoking status: Never Smoker   • Smokeless tobacco: Never Used   Substance Use Topics   • Alcohol use: No     Frequency: Never   • Drug use: No       Past Surgical History:   Procedure Laterality Date   • BACK SURGERY  06/07/2018    on L4 and L5 by dr mark   • BACK SURGERY  07/13/2017    L4-L5 Dr. Mark Widen spinal column   • ORIF CALCANEAL FRACTURE Left 05/2018    [facility]   • TONSILLECTOMY AND ADENOIDECTOMY      at age 48       Patient Active Problem List   Diagnosis   • Acute reaction to stress   • Asthma   • Obstructive sleep apnea   • Acute left-sided low back pain with right-sided sciatica   • Benign neoplasm of colon   • Carpal tunnel syndrome   • Encounter for annual general medical examination with abnormal findings in adult   • Hyperlipidemia   • Hypertension, benign   • Impotence of organic origin   • Lumbar disc disease   • Type II diabetes mellitus (CMS/HCC)   • Sleep apnea   • Tremor   • Memory loss   • Other fatigue   • Pain of left hip joint   • Osteoarthritis of left hip   • Atopic dermatitis   • Sprain of groin   • Screening PSA (prostate specific antigen)   • Colon cancer  "screening   • Chronic radicular lumbar pain   • Primary osteoarthritis of left hip   • Overweight   • Acute pain of left knee         Current Outpatient Medications:   •  aspirin 81 MG chewable tablet, Chew 81 mg Daily., Disp: , Rfl:   •  gabapentin (NEURONTIN) 100 MG capsule, , Disp: , Rfl:   •  lisinopril (PRINIVIL,ZESTRIL) 20 MG tablet, TAKE 1 TABLET BY MOUTH EVERY DAY, Disp: 90 tablet, Rfl: 3  •  meloxicam (MOBIC) 15 MG tablet, TAKE 1 TABLET BY MOUTH EVERY DAY, Disp: 90 tablet, Rfl: 4  •  metFORMIN (GLUCOPHAGE) 500 MG tablet, TAKE 1 TABLET BY MOUTH TWICE A DAY WITH MEALS, Disp: 180 tablet, Rfl: 0  •  Multiple Vitamins-Minerals (MULTIVITAMIN ADULT PO), Take 1 tablet by mouth Daily., Disp: , Rfl:   •  mupirocin (BACTROBAN) 2 % ointment, Apply a pea-sized amount to each nostril twice daily for 5 days prior to surgery., Disp: 22 g, Rfl: 0  •  Nystatin powder, Apply 1 application topically to the appropriate area as directed 2 (Two) Times a Day As Needed., Disp: , Rfl:          Review of Systems   Constitutional: Negative for chills and diaphoresis.   Eyes: Negative for visual disturbance.   Respiratory: Negative for shortness of breath.    Cardiovascular: Negative for chest pain and palpitations.   Gastrointestinal: Negative for abdominal pain and nausea.   Endocrine: Negative for polydipsia and polyphagia.   Musculoskeletal: Negative for neck stiffness.   Skin: Negative for color change and pallor.   Neurological: Negative for seizures and syncope.   Hematological: Negative for adenopathy.       Objective   /80   Pulse 86   Temp 97.7 °F (36.5 °C)   Resp 16   Ht 175.3 cm (69\")   Wt 97.6 kg (215 lb 3.2 oz)   SpO2 96%   BMI 31.78 kg/m²   Wt Readings from Last 3 Encounters:   12/30/19 97.6 kg (215 lb 3.2 oz)   11/12/19 95.7 kg (211 lb)   11/07/19 93 kg (205 lb)     Physical Exam   Constitutional: He is oriented to person, place, and time. He appears well-developed and well-nourished.   Cardiovascular: " Normal rate, regular rhythm, S1 normal, S2 normal, normal heart sounds, intact distal pulses and normal pulses. Exam reveals no gallop and no friction rub.   No murmur heard.  Pulmonary/Chest: Effort normal and breath sounds normal. No accessory muscle usage or stridor. He has no decreased breath sounds. He has no wheezes. He has no rhonchi. He has no rales.   Abdominal: Soft. Normal appearance, normal aorta and bowel sounds are normal. He exhibits no distension, no pulsatile midline mass and no mass. There is no hepatosplenomegaly. There is no tenderness. There is no rigidity, no rebound, no guarding, no CVA tenderness and negative Saenz's sign. No hernia.   Neurological: He is alert and oriented to person, place, and time. Coordination and gait normal.   Skin: Skin is warm and dry. Turgor is normal. He is not diaphoretic. No pallor.         Assessment/Plan       Left knee pain.  Flare OA versus medial meniscus tear.  Recommend joint injection, MRI, orthopedics referral he states he will consider.  Ice, NSAIDs, rehabilitation exercises discussed.  Call or return if persistent symptoms.  Osteoarthritis left hip.  Severe per x-ray, with daily pain and stiffness.  Replacement recommended per Dr. Arcos, he requests second opinion, referral to Dr. Elias scheduled.  Hypertension.  Good control.  Lexiscan Cardiolite stress testing scheduled.  Discussed low-sodium diet.  Diabetes.  Improved, diet controlled.  Follow-up recheck.  Hyperlipidemia.  Good control.  Discussed diet, exercise, lifestyle modification.  Follow-up recheck.  KRISTA.  Doing well on CPAP    Problem List Items Addressed This Visit        Cardiovascular and Mediastinum    Hypertension, benign    Overview     Discussed low sodium diet, lifestyle modification.  not tolerating lisinopril / change to carvedoilol  monitor bp  Follow up rechrck  Call / return if worsening symptoms.            Endocrine    Type II diabetes mellitus (CMS/HCC)    Overview      Improved, diet controlled/off metformin.  Follow-up recheck            Nervous and Auditory    Pain of left hip joint - Primary       Musculoskeletal and Integument    Osteoarthritis of left hip    Overview     Severe per x-ray, Ortho referral scheduled         Acute pain of left knee       Other    Overweight              Expected course, medications, and adverse effects discussed.  Call or return if worsening or persistent symptoms.

## 2020-01-06 ENCOUNTER — TELEPHONE (OUTPATIENT)
Dept: FAMILY MEDICINE CLINIC | Facility: CLINIC | Age: 65
End: 2020-01-06

## 2020-01-07 DIAGNOSIS — M54.41 ACUTE LEFT-SIDED LOW BACK PAIN WITH RIGHT-SIDED SCIATICA: Primary | ICD-10-CM

## 2020-01-12 RX ORDER — GABAPENTIN 100 MG/1
CAPSULE ORAL
Qty: 540 CAPSULE | Refills: 0 | Status: SHIPPED | OUTPATIENT
Start: 2020-01-12 | End: 2020-11-10

## 2020-01-17 ENCOUNTER — APPOINTMENT (OUTPATIENT)
Dept: NUCLEAR MEDICINE | Facility: HOSPITAL | Age: 65
End: 2020-01-17

## 2020-03-02 ENCOUNTER — APPOINTMENT (OUTPATIENT)
Dept: PREADMISSION TESTING | Facility: HOSPITAL | Age: 65
End: 2020-03-02

## 2020-03-06 ENCOUNTER — TELEPHONE (OUTPATIENT)
Dept: NEUROLOGY | Facility: CLINIC | Age: 65
End: 2020-03-06

## 2020-03-06 NOTE — TELEPHONE ENCOUNTER
Patient has had a recent head surgery and needs to reschedule his follow up appointment for the sleep study. If I attempt to reschedule here at the hub it puts him out into August which is way past his 90 day window. Could you all assist in this matter?         Callback number- [phone]

## 2020-03-06 NOTE — TELEPHONE ENCOUNTER
Are you sure you are putting him as a follow up sleep? Because there are some openings as Follow Up Sleep.  If you can't call the office and Opal can assist you.

## 2020-03-16 ENCOUNTER — OFFICE VISIT (OUTPATIENT)
Dept: FAMILY MEDICINE CLINIC | Facility: CLINIC | Age: 65
End: 2020-03-16

## 2020-03-16 VITALS
HEART RATE: 92 BPM | WEIGHT: 217.8 LBS | OXYGEN SATURATION: 98 % | DIASTOLIC BLOOD PRESSURE: 60 MMHG | SYSTOLIC BLOOD PRESSURE: 110 MMHG | TEMPERATURE: 98.2 F | HEIGHT: 69 IN | RESPIRATION RATE: 18 BRPM | BODY MASS INDEX: 32.26 KG/M2

## 2020-03-16 DIAGNOSIS — E11.9 TYPE 2 DIABETES MELLITUS WITHOUT COMPLICATION, WITHOUT LONG-TERM CURRENT USE OF INSULIN (HCC): Primary | ICD-10-CM

## 2020-03-16 DIAGNOSIS — I10 HYPERTENSION, BENIGN: ICD-10-CM

## 2020-03-16 DIAGNOSIS — M16.12 PRIMARY OSTEOARTHRITIS OF LEFT HIP: ICD-10-CM

## 2020-03-16 LAB
BILIRUB BLD-MCNC: NEGATIVE MG/DL
CLARITY, POC: CLEAR
COLOR UR: YELLOW
GLUCOSE UR STRIP-MCNC: NEGATIVE MG/DL
KETONES UR QL: NEGATIVE
LEUKOCYTE EST, POC: NEGATIVE
NITRITE UR-MCNC: NEGATIVE MG/ML
PH UR: 6.5 [PH] (ref 5–8)
POC CREATININE URINE: NEGATIVE
POC MICROALBUMIN URINE: 20
PROT UR STRIP-MCNC: ABNORMAL MG/DL
RBC # UR STRIP: NEGATIVE /UL
SP GR UR: 1.01 (ref 1–1.03)
UROBILINOGEN UR QL: NORMAL

## 2020-03-16 PROCEDURE — 81003 URINALYSIS AUTO W/O SCOPE: CPT | Performed by: FAMILY MEDICINE

## 2020-03-16 PROCEDURE — 99213 OFFICE O/P EST LOW 20 MIN: CPT | Performed by: FAMILY MEDICINE

## 2020-03-16 PROCEDURE — 82044 UR ALBUMIN SEMIQUANTITATIVE: CPT | Performed by: FAMILY MEDICINE

## 2020-03-16 RX ORDER — ASPIRIN 81 MG/1
81 TABLET, CHEWABLE ORAL DAILY
COMMUNITY
Start: 2020-03-04 | End: 2020-04-29 | Stop reason: SDUPTHER

## 2020-03-16 RX ORDER — HYDROCODONE BITARTRATE AND ACETAMINOPHEN 7.5; 325 MG/1; MG/1
TABLET ORAL
COMMUNITY
Start: 2020-03-04 | End: 2020-03-16

## 2020-03-16 NOTE — PROGRESS NOTES
Subjective   Rudy Henriquez Jr. is a 64 y.o. male.     Chief Complaint   Patient presents with   • Follow-up       Rudy was seen at [facility]. He was admitted on 03/04/2020  for left hip arthioplasty. He was discharged on 03/04/2020. Discharge diagnosis was left hip arthoplasty The patient stated that they do not need help with their daily life and activities. The patient stated that they do have emotional support at home.      Diabetes   He presents for his follow-up diabetic visit. He has type 2 diabetes mellitus. Pertinent negatives for hypoglycemia include no dizziness, nervousness/anxiousness, pallor, seizures or sweats. Pertinent negatives for diabetes include no chest pain, no fatigue, no polydipsia, no polyphagia, no visual change and no weakness. Risk factors for coronary artery disease include diabetes mellitus, male sex, hypertension and dyslipidemia. When asked about current treatments, none were reported. He participates in exercise daily. (Rudy's glucose was 107 today) He does not see a podiatrist.Eye exam is not current.            I personally reviewed and updated the patient's allergies, medications, problem list, and past medical, surgical, social, and family history.     Family History   Problem Relation Age of Onset   • Colon cancer Mother    • Lung cancer Father    • Diabetes Sister        Social History     Tobacco Use   • Smoking status: Never Smoker   • Smokeless tobacco: Never Used   Substance Use Topics   • Alcohol use: No     Frequency: Never   • Drug use: No       Past Surgical History:   Procedure Laterality Date   • BACK SURGERY  06/07/2018    on L4 and L5 by dr mark   • BACK SURGERY  07/13/2017    L4-L5 Dr. Mark Widejaqueline spinal column   • HIP SURGERY Left 03/04/2020   • ORIF CALCANEAL FRACTURE Left 05/2018    [facility]   • TONSILLECTOMY AND ADENOIDECTOMY      at age 48       Patient Active Problem List   Diagnosis   • Acute reaction to stress   • Asthma   • Obstructive  sleep apnea   • Acute left-sided low back pain with right-sided sciatica   • Benign neoplasm of colon   • Carpal tunnel syndrome   • Encounter for annual general medical examination with abnormal findings in adult   • Hyperlipidemia   • Hypertension, benign   • Impotence of organic origin   • Lumbar disc disease   • Type II diabetes mellitus (CMS/HCC)   • Sleep apnea   • Tremor   • Memory loss   • Other fatigue   • Pain of left hip joint   • Osteoarthritis of left hip   • Atopic dermatitis   • Sprain of groin   • Screening PSA (prostate specific antigen)   • Colon cancer screening   • Chronic radicular lumbar pain   • Primary osteoarthritis of left hip   • Overweight   • Acute pain of left knee         Current Outpatient Medications:   •  aspirin 81 MG chewable tablet, Chew 81 mg Daily., Disp: , Rfl:   •  gabapentin (NEURONTIN) 100 MG capsule, TAKE 2 CAPSULES BY MOUTH 3 (THREE) TIMES A DAY, Disp: 540 capsule, Rfl: 0  •  lisinopril (PRINIVIL,ZESTRIL) 20 MG tablet, TAKE 1 TABLET BY MOUTH EVERY DAY, Disp: 90 tablet, Rfl: 3  •  meloxicam (MOBIC) 15 MG tablet, TAKE 1 TABLET BY MOUTH EVERY DAY, Disp: 90 tablet, Rfl: 4  •  Multiple Vitamins-Minerals (MULTIVITAMIN ADULT PO), Take 1 tablet by mouth Daily., Disp: , Rfl:   •  mupirocin (BACTROBAN) 2 % ointment, Apply a pea-sized amount to each nostril twice daily for 5 days prior to surgery., Disp: 22 g, Rfl: 0  •  Nystatin powder, Apply 1 application topically to the appropriate area as directed 2 (Two) Times a Day As Needed., Disp: , Rfl:   •  aspirin 81 MG chewable tablet, Chew 81 mg Daily., Disp: , Rfl:   •  HYDROcodone-acetaminophen (NORCO) 7.5-325 MG per tablet, , Disp: , Rfl:   •  metFORMIN (GLUCOPHAGE) 500 MG tablet, TAKE 1 TABLET BY MOUTH TWICE A DAY WITH MEALS, Disp: 180 tablet, Rfl: 0         Review of Systems   Constitutional: Negative for chills, diaphoresis and fatigue.   Eyes: Negative for visual disturbance.   Respiratory: Negative for shortness of breath.   "  Cardiovascular: Negative for chest pain and palpitations.   Gastrointestinal: Negative for abdominal pain and nausea.   Endocrine: Negative for polydipsia and polyphagia.   Musculoskeletal: Negative for neck stiffness.   Skin: Negative for color change and pallor.   Neurological: Negative for dizziness, seizures, syncope and weakness.   Hematological: Negative for adenopathy.   Psychiatric/Behavioral: The patient is not nervous/anxious.        Objective   /60 (BP Location: Right arm, Patient Position: Sitting, Cuff Size: Adult)   Pulse 92   Temp 98.2 °F (36.8 °C)   Resp 18   Ht 175.3 cm (69\")   Wt 98.8 kg (217 lb 12.8 oz)   SpO2 98%   BMI 32.16 kg/m²   Wt Readings from Last 3 Encounters:   03/16/20 98.8 kg (217 lb 12.8 oz)   12/30/19 97.6 kg (215 lb 3.2 oz)   11/12/19 95.7 kg (211 lb)     Physical Exam   Constitutional: He is oriented to person, place, and time. He appears well-developed and well-nourished.   Cardiovascular: Normal rate, regular rhythm, S1 normal, S2 normal, normal heart sounds, intact distal pulses and normal pulses. Exam reveals no gallop and no friction rub.   No murmur heard.  Pulmonary/Chest: Effort normal and breath sounds normal. No accessory muscle usage or stridor. He has no decreased breath sounds. He has no wheezes. He has no rhonchi. He has no rales.   Abdominal: Soft. Normal appearance, normal aorta and bowel sounds are normal. He exhibits no distension, no pulsatile midline mass and no mass. There is no hepatosplenomegaly. There is no tenderness. There is no rigidity, no rebound, no guarding, no CVA tenderness and negative Saenz's sign. No hernia.   Neurological: He is alert and oriented to person, place, and time. Coordination and gait normal.   Skin: Skin is warm and dry. Turgor is normal. He is not diaphoretic. No pallor.   Anterior hip replacement incision clean and dry, healing well         Assessment/Plan     Left knee pain.  Flare OA versus medial meniscus tear.  " Recommend joint injection, MRI, orthopedics referral he states he will consider.  Ice, NSAIDs, rehabilitation exercises discussed.  Call or return if persistent symptoms.  Osteoarthritis left hip.    Improved today status post Replacement per Dr. Elias, advancing activity.  Hypertension.  Good control.  Lexiscan Cardiolite stress testing scheduled.  Discussed low-sodium diet.  Diabetes.  Improved, diet controlled.  Follow-up recheck.  Hyperlipidemia.  Good control.  Discussed diet, exercise, lifestyle modification.  Follow-up recheck.  KRISTA.  Doing well on CPAP       Problem List Items Addressed This Visit        High    Type II diabetes mellitus (CMS/HCC) - Primary    Overview     Improved, diet controlled/off metformin.  Follow-up recheck         Relevant Orders    POC Urinalysis Dipstick, Multipro (Completed)    POC Microalbumin (Completed)    Osteoarthritis of left hip       Unprioritized    Hypertension, benign              Expected course, medications, and adverse effects discussed.  Call or return if worsening or persistent symptoms.

## 2020-04-29 ENCOUNTER — OFFICE VISIT (OUTPATIENT)
Dept: FAMILY MEDICINE CLINIC | Facility: CLINIC | Age: 65
End: 2020-04-29

## 2020-04-29 VITALS
WEIGHT: 216.8 LBS | SYSTOLIC BLOOD PRESSURE: 145 MMHG | OXYGEN SATURATION: 96 % | BODY MASS INDEX: 32.11 KG/M2 | DIASTOLIC BLOOD PRESSURE: 79 MMHG | RESPIRATION RATE: 16 BRPM | HEART RATE: 85 BPM | HEIGHT: 69 IN

## 2020-04-29 DIAGNOSIS — M16.12 PRIMARY OSTEOARTHRITIS OF LEFT HIP: ICD-10-CM

## 2020-04-29 DIAGNOSIS — I10 HYPERTENSION, BENIGN: ICD-10-CM

## 2020-04-29 DIAGNOSIS — M54.41 ACUTE LEFT-SIDED LOW BACK PAIN WITH RIGHT-SIDED SCIATICA: Primary | ICD-10-CM

## 2020-04-29 DIAGNOSIS — E66.3 OVERWEIGHT: ICD-10-CM

## 2020-04-29 DIAGNOSIS — M51.9 LUMBAR DISC DISEASE: ICD-10-CM

## 2020-04-29 PROCEDURE — 99213 OFFICE O/P EST LOW 20 MIN: CPT | Performed by: FAMILY MEDICINE

## 2020-04-29 RX ORDER — CYCLOBENZAPRINE HCL 10 MG
TABLET ORAL
Qty: 30 TABLET | Refills: 2 | Status: SHIPPED | OUTPATIENT
Start: 2020-04-29 | End: 2020-10-20

## 2020-04-29 RX ORDER — UBIDECARENONE 50 MG
CAPSULE ORAL
COMMUNITY

## 2020-04-29 RX ORDER — PREDNISONE 1 MG/1
5 TABLET ORAL DAILY
Qty: 45 TABLET | Refills: 0 | Status: SHIPPED | OUTPATIENT
Start: 2020-04-29 | End: 2020-11-10

## 2020-05-01 ENCOUNTER — TELEPHONE (OUTPATIENT)
Dept: FAMILY MEDICINE CLINIC | Facility: CLINIC | Age: 65
End: 2020-05-01

## 2020-05-01 DIAGNOSIS — I10 HYPERTENSION, BENIGN: Primary | ICD-10-CM

## 2020-05-01 RX ORDER — LISINOPRIL 10 MG/1
20 TABLET ORAL DAILY
Qty: 60 TABLET | Refills: 12 | Status: SHIPPED | OUTPATIENT
Start: 2020-05-01 | End: 2020-10-20 | Stop reason: SDUPTHER

## 2020-05-01 NOTE — TELEPHONE ENCOUNTER
Patient called and said that the [facility] can't get the [facility] and needs a rx for him to take 2 Lisinipril 10mg . Please send to [facility] on . Thanks Allison

## 2020-05-08 ENCOUNTER — TELEPHONE (OUTPATIENT)
Dept: FAMILY MEDICINE CLINIC | Facility: CLINIC | Age: 65
End: 2020-05-08

## 2020-05-08 NOTE — TELEPHONE ENCOUNTER
Called  Left message   We tried to make an appointment with [facility]  To arrange an appointment, they have requested  records and they will   Call with their decision.

## 2020-05-20 ENCOUNTER — TELEPHONE (OUTPATIENT)
Dept: FAMILY MEDICINE CLINIC | Facility: CLINIC | Age: 65
End: 2020-05-20

## 2020-07-13 RX ORDER — MELOXICAM 15 MG/1
TABLET ORAL
Qty: 30 TABLET | Refills: 0 | Status: SHIPPED | OUTPATIENT
Start: 2020-07-13 | End: 2020-08-10

## 2020-08-10 DIAGNOSIS — M51.9 LUMBAR DISC DISEASE: Primary | ICD-10-CM

## 2020-08-10 RX ORDER — MELOXICAM 15 MG/1
TABLET ORAL
Qty: 30 TABLET | Refills: 0 | Status: SHIPPED | OUTPATIENT
Start: 2020-08-10 | End: 2020-09-08

## 2020-08-14 ENCOUNTER — TELEPHONE (OUTPATIENT)
Dept: FAMILY MEDICINE CLINIC | Facility: CLINIC | Age: 65
End: 2020-08-14

## 2020-08-14 NOTE — TELEPHONE ENCOUNTER
Rachael called this morning stating Rudy has several ingrown toenails and wanted to know which podiatrist you recommended.  Thanks.

## 2020-09-06 DIAGNOSIS — M51.9 LUMBAR DISC DISEASE: ICD-10-CM

## 2020-09-08 RX ORDER — MELOXICAM 15 MG/1
TABLET ORAL
Qty: 30 TABLET | Refills: 0 | Status: SHIPPED | OUTPATIENT
Start: 2020-09-08 | End: 2020-10-05

## 2020-10-03 DIAGNOSIS — M51.9 LUMBAR DISC DISEASE: ICD-10-CM

## 2020-10-05 RX ORDER — MELOXICAM 15 MG/1
TABLET ORAL
Qty: 30 TABLET | Refills: 0 | Status: SHIPPED | OUTPATIENT
Start: 2020-10-05 | End: 2020-10-20 | Stop reason: SDUPTHER

## 2020-10-16 DIAGNOSIS — E78.2 MIXED HYPERLIPIDEMIA: Primary | ICD-10-CM

## 2020-10-16 DIAGNOSIS — Z12.5 SCREENING PSA (PROSTATE SPECIFIC ANTIGEN): ICD-10-CM

## 2020-10-16 DIAGNOSIS — I10 HYPERTENSION, BENIGN: ICD-10-CM

## 2020-10-17 LAB
ALBUMIN SERPL-MCNC: 4.5 G/DL (ref 3.8–4.8)
ALBUMIN/GLOB SERPL: 1.9 {RATIO} (ref 1.2–2.2)
ALP SERPL-CCNC: 89 IU/L (ref 39–117)
ALT SERPL-CCNC: 38 IU/L (ref 0–44)
AST SERPL-CCNC: 34 IU/L (ref 0–40)
BASOPHILS # BLD AUTO: 0.1 X10E3/UL (ref 0–0.2)
BASOPHILS NFR BLD AUTO: 1 %
BILIRUB SERPL-MCNC: 0.7 MG/DL (ref 0–1.2)
BUN SERPL-MCNC: 22 MG/DL (ref 8–27)
BUN/CREAT SERPL: 23 (ref 10–24)
CALCIUM SERPL-MCNC: 9.4 MG/DL (ref 8.6–10.2)
CHLORIDE SERPL-SCNC: 102 MMOL/L (ref 96–106)
CHOLEST SERPL-MCNC: 161 MG/DL (ref 100–199)
CHOLEST/HDLC SERPL: 4 RATIO (ref 0–5)
CO2 SERPL-SCNC: 26 MMOL/L (ref 20–29)
CREAT SERPL-MCNC: 0.95 MG/DL (ref 0.76–1.27)
EOSINOPHIL # BLD AUTO: 0.3 X10E3/UL (ref 0–0.4)
EOSINOPHIL NFR BLD AUTO: 5 %
ERYTHROCYTE [DISTWIDTH] IN BLOOD BY AUTOMATED COUNT: 12.5 % (ref 11.6–15.4)
GLOBULIN SER CALC-MCNC: 2.4 G/DL (ref 1.5–4.5)
GLUCOSE SERPL-MCNC: 127 MG/DL (ref 65–99)
HCT VFR BLD AUTO: 48 % (ref 37.5–51)
HDLC SERPL-MCNC: 40 MG/DL
HGB BLD-MCNC: 16.4 G/DL (ref 13–17.7)
IMM GRANULOCYTES # BLD AUTO: 0 X10E3/UL (ref 0–0.1)
IMM GRANULOCYTES NFR BLD AUTO: 0 %
LDLC SERPL CALC-MCNC: 105 MG/DL (ref 0–99)
LYMPHOCYTES # BLD AUTO: 1 X10E3/UL (ref 0.7–3.1)
LYMPHOCYTES NFR BLD AUTO: 16 %
MCH RBC QN AUTO: 33 PG (ref 26.6–33)
MCHC RBC AUTO-ENTMCNC: 34.2 G/DL (ref 31.5–35.7)
MCV RBC AUTO: 97 FL (ref 79–97)
MONOCYTES # BLD AUTO: 0.5 X10E3/UL (ref 0.1–0.9)
MONOCYTES NFR BLD AUTO: 9 %
NEUTROPHILS # BLD AUTO: 4.4 X10E3/UL (ref 1.4–7)
NEUTROPHILS NFR BLD AUTO: 69 %
PLATELET # BLD AUTO: 252 X10E3/UL (ref 150–450)
POTASSIUM SERPL-SCNC: 4.3 MMOL/L (ref 3.5–5.2)
PROT SERPL-MCNC: 6.9 G/DL (ref 6–8.5)
PSA SERPL-MCNC: 0.3 NG/ML (ref 0–4)
RBC # BLD AUTO: 4.97 X10E6/UL (ref 4.14–5.8)
SODIUM SERPL-SCNC: 140 MMOL/L (ref 134–144)
TRIGL SERPL-MCNC: 87 MG/DL (ref 0–149)
TSH SERPL DL<=0.005 MIU/L-ACNC: 3.86 UIU/ML (ref 0.45–4.5)
VLDLC SERPL CALC-MCNC: 16 MG/DL (ref 5–40)
WBC # BLD AUTO: 6.3 X10E3/UL (ref 3.4–10.8)

## 2020-10-20 ENCOUNTER — OFFICE VISIT (OUTPATIENT)
Dept: FAMILY MEDICINE CLINIC | Facility: CLINIC | Age: 65
End: 2020-10-20

## 2020-10-20 VITALS
BODY MASS INDEX: 32.76 KG/M2 | SYSTOLIC BLOOD PRESSURE: 155 MMHG | OXYGEN SATURATION: 97 % | RESPIRATION RATE: 18 BRPM | DIASTOLIC BLOOD PRESSURE: 81 MMHG | HEART RATE: 75 BPM | WEIGHT: 221.2 LBS | TEMPERATURE: 97.1 F | HEIGHT: 69 IN

## 2020-10-20 DIAGNOSIS — R07.9 CHEST PAIN, UNSPECIFIED TYPE: ICD-10-CM

## 2020-10-20 DIAGNOSIS — Z00.01 ENCOUNTER FOR ANNUAL GENERAL MEDICAL EXAMINATION WITH ABNORMAL FINDINGS IN ADULT: Primary | ICD-10-CM

## 2020-10-20 DIAGNOSIS — E11.65 TYPE 2 DIABETES MELLITUS WITH HYPERGLYCEMIA, WITHOUT LONG-TERM CURRENT USE OF INSULIN (HCC): Chronic | ICD-10-CM

## 2020-10-20 DIAGNOSIS — Z12.5 SCREENING PSA (PROSTATE SPECIFIC ANTIGEN): ICD-10-CM

## 2020-10-20 DIAGNOSIS — R09.89 CAROTID BRUIT, UNSPECIFIED LATERALITY: ICD-10-CM

## 2020-10-20 DIAGNOSIS — E66.09 CLASS 1 OBESITY DUE TO EXCESS CALORIES WITHOUT SERIOUS COMORBIDITY WITH BODY MASS INDEX (BMI) OF 32.0 TO 32.9 IN ADULT: ICD-10-CM

## 2020-10-20 DIAGNOSIS — I10 HYPERTENSION, BENIGN: Chronic | ICD-10-CM

## 2020-10-20 DIAGNOSIS — G47.33 OBSTRUCTIVE SLEEP APNEA: ICD-10-CM

## 2020-10-20 DIAGNOSIS — Z12.11 COLON CANCER SCREENING: ICD-10-CM

## 2020-10-20 DIAGNOSIS — M16.12 PRIMARY OSTEOARTHRITIS OF LEFT HIP: ICD-10-CM

## 2020-10-20 DIAGNOSIS — M51.9 LUMBAR DISC DISEASE: ICD-10-CM

## 2020-10-20 PROBLEM — M25.562 ACUTE PAIN OF LEFT KNEE: Status: RESOLVED | Noted: 2019-12-30 | Resolved: 2020-10-20

## 2020-10-20 PROBLEM — R53.83 OTHER FATIGUE: Status: RESOLVED | Noted: 2019-08-13 | Resolved: 2020-10-20

## 2020-10-20 PROBLEM — S33.8XXA SPRAIN OF GROIN: Status: RESOLVED | Noted: 2019-08-27 | Resolved: 2020-10-20

## 2020-10-20 PROBLEM — E66.3 OVERWEIGHT: Status: RESOLVED | Noted: 2019-12-30 | Resolved: 2020-10-20

## 2020-10-20 LAB
BILIRUB BLD-MCNC: NEGATIVE MG/DL
CLARITY, POC: CLEAR
COLOR UR: YELLOW
GLUCOSE UR STRIP-MCNC: ABNORMAL MG/DL
KETONES UR QL: NEGATIVE
LEUKOCYTE EST, POC: NEGATIVE
NITRITE UR-MCNC: NEGATIVE MG/ML
PH UR: 6 [PH] (ref 5–8)
PROT UR STRIP-MCNC: ABNORMAL MG/DL
RBC # UR STRIP: NEGATIVE /UL
SP GR UR: 1.01 (ref 1–1.03)
UROBILINOGEN UR QL: NORMAL

## 2020-10-20 PROCEDURE — 99396 PREV VISIT EST AGE 40-64: CPT | Performed by: FAMILY MEDICINE

## 2020-10-20 PROCEDURE — 81002 URINALYSIS NONAUTO W/O SCOPE: CPT | Performed by: FAMILY MEDICINE

## 2020-10-20 PROCEDURE — 99213 OFFICE O/P EST LOW 20 MIN: CPT | Performed by: FAMILY MEDICINE

## 2020-10-20 RX ORDER — AZELASTINE 1 MG/ML
SPRAY, METERED NASAL
COMMUNITY
Start: 2020-10-16

## 2020-10-20 RX ORDER — MELOXICAM 15 MG/1
15 TABLET ORAL DAILY
Qty: 90 TABLET | Refills: 3 | Status: SHIPPED | OUTPATIENT
Start: 2020-10-20 | End: 2021-10-11

## 2020-10-20 RX ORDER — LISINOPRIL 20 MG/1
20 TABLET ORAL DAILY
Qty: 90 TABLET | Refills: 3 | Status: SHIPPED | OUTPATIENT
Start: 2020-10-20 | End: 2021-10-11

## 2020-10-20 RX ORDER — GABAPENTIN 600 MG/1
TABLET ORAL
COMMUNITY
Start: 2020-10-02 | End: 2020-11-10

## 2020-10-20 NOTE — PROGRESS NOTES
Subjective   Rudy Henriquez Jr. is a 65 y.o. male.     Chief Complaint   Patient presents with   • Annual Exam   • Diabetes   • Hyperlipidemia   • Hypertension       The patient is here: to discuss health maintenance and disease prevention to follow up on multiple medical problems.  Last comprehensive physical was on 8/27/2019.  Previous physical was performed by  Charles Calderon MD  Overall has: moderate activity with work/home activities, exercises 2 - 3 times per week, good appetite, feels well with minor complaints, good energy level and is sleeping well. Nutrition: appropriate balanced diet, balanced diet and eating a variety of foods. Last tetanus shot was 3/12/2019. Patient's last colonoscopy was: 4/27/2017. Patient's last PSA was: 0.3 on 10/16/2020. Patients last AAA was 1/29/2009.    Diabetes  He presents for his follow-up diabetic visit. He has type 2 diabetes mellitus. Pertinent negatives for hypoglycemia include no confusion, dizziness, headaches, hunger, pallor, seizures, sleepiness, speech difficulty, sweats or tremors. Associated symptoms include fatigue. Pertinent negatives for diabetes include no chest pain, no polydipsia, no polyphagia, no visual change and no weight loss. There are no hypoglycemic complications. Risk factors for coronary artery disease include male sex, obesity and diabetes mellitus. When asked about current treatments, none were reported. He is compliant with treatment all of the time. Meal planning includes avoidance of concentrated sweets. He has not had a previous visit with a dietitian. He participates in exercise three times a week. (Patient states his glucose was 108 this morning.) An ACE inhibitor/angiotensin II receptor blocker is being taken. He sees a podiatrist.Eye exam is current.   Hypertension  This is a chronic problem. The current episode started more than 1 year ago. The problem is unchanged. The problem is uncontrolled. Pertinent negatives include no chest  pain, headaches, neck pain, palpitations, shortness of breath or sweats. Risk factors for coronary artery disease include obesity, male gender and diabetes mellitus. Current antihypertension treatment includes ACE inhibitors. The current treatment provides moderate improvement. There are no compliance problems.         Recent Hospitalizations:  No hospitalization(s) within the last year..  ccc      I personally reviewed and updated the patient's allergies, medications, problem list, and past medical, surgical, social, and family history. I have reviewed and confirmed the accuracy of the HPI and ROS as documented by the MA/LPN/RN Charles Calderon MD    Family History   Problem Relation Age of Onset   • Colon cancer Mother    • Lung cancer Father    • Diabetes Sister        Social History     Tobacco Use   • Smoking status: Never Smoker   • Smokeless tobacco: Never Used   Substance Use Topics   • Alcohol use: No     Frequency: Never   • Drug use: No       Past Surgical History:   Procedure Laterality Date   • BACK SURGERY  06/07/2018    on L4 and L5 by dr mark   • BACK SURGERY  07/13/2017    L4-L5 Dr. Mark Widen spinal column   • HIP SURGERY Left 03/04/2020   • ORIF CALCANEAL FRACTURE Left 05/2018    [facility]   • TONSILLECTOMY AND ADENOIDECTOMY      at age 48       Patient Active Problem List   Diagnosis   • Acute reaction to stress   • Asthma   • Obstructive sleep apnea   • Acute left-sided low back pain with right-sided sciatica   • Benign neoplasm of colon   • Carpal tunnel syndrome   • Encounter for annual general medical examination with abnormal findings in adult   • Hyperlipidemia   • Hypertension, benign   • Impotence of organic origin   • Lumbar disc disease   • Sleep apnea   • Tremor   • Memory loss   • Class 1 obesity due to excess calories without serious comorbidity with body mass index (BMI) of 32.0 to 32.9 in adult   • Osteoarthritis of left hip   • Atopic dermatitis   • Screening PSA (prostate  specific antigen)   • Colon cancer screening   • Chronic radicular lumbar pain   • Primary osteoarthritis of left hip   • Elevated blood sugar         Current Outpatient Medications:   •  aspirin 81 MG chewable tablet, Chew 81 mg Daily., Disp: , Rfl:   •  azelastine (ASTELIN) 0.1 % nasal spray, , Disp: , Rfl:   •  gabapentin (NEURONTIN) 600 MG tablet, , Disp: , Rfl:   •  lisinopril (PRINIVIL,ZESTRIL) 20 MG tablet, Take 1 tablet by mouth Daily., Disp: 90 tablet, Rfl: 3  •  meloxicam (MOBIC) 15 MG tablet, Take 1 tablet by mouth Daily., Disp: 90 tablet, Rfl: 3  •  Multiple Vitamins-Minerals (MULTIVITAMIN ADULT PO), Take 1 tablet by mouth Daily., Disp: , Rfl:   •  Nystatin powder, Apply 1 application topically to the appropriate area as directed 2 (Two) Times a Day As Needed., Disp: , Rfl:   •  Red Yeast Rice 600 MG tablet, Take  by mouth., Disp: , Rfl:   •  gabapentin (NEURONTIN) 100 MG capsule, TAKE 2 CAPSULES BY MOUTH 3 (THREE) TIMES A DAY, Disp: 540 capsule, Rfl: 0  •  mupirocin (BACTROBAN) 2 % ointment, Apply a pea-sized amount to each nostril twice daily for 5 days prior to surgery., Disp: 22 g, Rfl: 0  •  predniSONE (DELTASONE) 5 MG tablet, Take 1 tablet by mouth Daily. 40mg x 3 days, 20mg x 3 days, 10mg x 3 days, 5mg x 3 days, Disp: 45 tablet, Rfl: 0         Review of Systems   Constitutional: Positive for fatigue. Negative for chills, diaphoresis and unexpected weight loss.   HENT: Negative for trouble swallowing and voice change.    Eyes: Negative for visual disturbance.   Respiratory: Negative for shortness of breath.    Cardiovascular: Negative for chest pain and palpitations.   Gastrointestinal: Negative for abdominal pain and nausea.   Endocrine: Negative for polydipsia and polyphagia.   Genitourinary: Negative for hematuria.   Musculoskeletal: Negative for neck pain and neck stiffness.   Skin: Negative for color change and pallor.   Allergic/Immunologic: Negative for immunocompromised state.  "  Neurological: Negative for dizziness, tremors, seizures, syncope, speech difficulty and confusion.   Hematological: Negative for adenopathy.   Psychiatric/Behavioral: Negative for hallucinations, sleep disturbance and suicidal ideas.       I have reviewed and confirmed the accuracy of the ROS as documented by the MA/LPN/RN Charles Calderon MD      Objective   /81 (BP Location: Right arm, Patient Position: Sitting, Cuff Size: Adult)   Pulse 75   Temp 97.1 °F (36.2 °C)   Resp 18   Ht 175.3 cm (69\")   Wt 100 kg (221 lb 3.2 oz)   SpO2 97%   BMI 32.67 kg/m²   BP Readings from Last 3 Encounters:   10/20/20 155/81   04/29/20 145/79   03/16/20 110/60     Wt Readings from Last 3 Encounters:   10/20/20 100 kg (221 lb 3.2 oz)   04/29/20 98.3 kg (216 lb 12.8 oz)   03/16/20 98.8 kg (217 lb 12.8 oz)     Physical Exam  Constitutional:       Appearance: He is well-developed. He is not diaphoretic.   HENT:      Head: Normocephalic.      Right Ear: Tympanic membrane, ear canal and external ear normal.      Left Ear: Tympanic membrane, ear canal and external ear normal.      Nose: Nose normal.   Eyes:      General: Lids are normal.      Conjunctiva/sclera: Conjunctivae normal.      Pupils: Pupils are equal, round, and reactive to light.   Neck:      Thyroid: No thyromegaly.      Vascular: No carotid bruit or JVD.      Trachea: No tracheal deviation.   Cardiovascular:      Rate and Rhythm: Normal rate and regular rhythm.      Heart sounds: Normal heart sounds. No murmur. No friction rub. No gallop.    Pulmonary:      Effort: Pulmonary effort is normal.      Breath sounds: Normal breath sounds. No stridor. No decreased breath sounds, wheezing or rales.   Abdominal:      General: Bowel sounds are normal. There is no distension.      Palpations: Abdomen is soft. There is no mass.      Tenderness: There is no abdominal tenderness. There is no guarding or rebound.      Hernia: No hernia is present.   Lymphadenopathy:      Head: "      Right side of head: No submental, submandibular, tonsillar, preauricular, posterior auricular or occipital adenopathy.      Left side of head: No submental, submandibular, tonsillar, preauricular, posterior auricular or occipital adenopathy.      Cervical: No cervical adenopathy.   Skin:     General: Skin is warm and dry.      Coloration: Skin is not pale.   Neurological:      Mental Status: He is alert and oriented to person, place, and time.      Cranial Nerves: No cranial nerve deficit.      Sensory: No sensory deficit.      Coordination: Coordination normal.      Gait: Gait normal.      Deep Tendon Reflexes: Reflexes are normal and symmetric.         Recent Lab Results:    Hemoglobin A1C   Date Value Ref Range Status   08/13/2019 5.9 % Final   10/22/2018 5.7 4.6 - 7.1 % Final   07/06/2018 5.8 4.6 - 7.1 % Final     Lab Results   Component Value Date     (H) 10/16/2020     Lab Results   Component Value Date     (H) 10/16/2020     (H) 08/16/2019     03/19/2018     Lab Results   Component Value Date    CHOL 184 03/19/2018    CHOL 204 (H) 02/22/2017     Lab Results   Component Value Date    TRIG 87 10/16/2020    TRIG 83 08/16/2019    TRIG 131 03/19/2018     Lab Results   Component Value Date    HDL 40 10/16/2020    HDL 39 (L) 08/16/2019    HDL 38 (L) 03/19/2018     Lab Results   Component Value Date    PSA 0.3 10/16/2020    PSA 0.4 08/16/2019    PSA 0.4 03/19/2018     Lab Results   Component Value Date    WBC 6.3 10/16/2020    HGB 16.4 10/16/2020    HCT 48.0 10/16/2020    MCV 97 10/16/2020     10/16/2020     Lab Results   Component Value Date    TSH 3.860 10/16/2020      Lab Results   Component Value Date    GLUCOSE 105 (H) 05/25/2018    BUN 22 10/16/2020    CREATININE 0.95 10/16/2020    EGFRIFNONA 85 10/16/2020    EGFRIFAFRI 98 10/16/2020    BCR 23 10/16/2020    K 4.3 10/16/2020    CO2 26 10/16/2020    CALCIUM 9.4 10/16/2020    PROTENTOTREF 6.9 10/16/2020    ALBUMIN 4.5  10/16/2020    LABIL2 1.9 10/16/2020    AST 34 10/16/2020    ALT 38 10/16/2020     No results found for: RADHA, RF, SEDRATE   No results found for: CRP   No results found for: IRON, TIBC, FERRITIN   No results found for: [id number]     Age-appropriate Screening Schedule:  Refer to the list below for future screening recommendations based on patient's age, sex and/or medical conditions. Orders for these recommended tests are listed in the plan section. The patient has been provided with a written plan.    Health Maintenance   Topic Date Due   • DIABETIC EYE EXAM  07/15/2019   • HEMOGLOBIN A1C  02/13/2020   • DIABETIC FOOT EXAM  09/01/2020   • URINE MICROALBUMIN  03/16/2021   • LIPID PANEL  10/16/2021   • COLONOSCOPY  04/27/2027   • TDAP/TD VACCINES (2 - Td) 03/12/2029   • INFLUENZA VACCINE  Completed   • ZOSTER VACCINE  Discontinued           Assessment/Plan      Medications        Problem List         LOS    Physical.  Doing well, vaccines current.  Discussed coated baby aspirin daily.  Discussed health maintenance, screening test, lifestyle modification.  Lumbar disc disease.    Improved today with epidural injections.  Positive rIght lower extremity radiculopathy.  MRI with bulging disc to right L3-L4 9/19.  Status post lumbar disc surgery by to Dr. Mark.  Pain management referral scheduled/consider epidural injections.  Flare currently start prednisone.  Restart nightly muscle relaxer.  Increase Neurontin to 300 to 400 mg twice daily, 600 at night.  Follow-up recheck.  Call return if worsening symptoms.  Left knee pain.  Improved/resolved.  Flare OA versus medial meniscus tear.  Recommend joint injection, MRI, orthopedics referral he states he will consider.  Ice, NSAIDs, rehabilitation exercises discussed.  Call or return if persistent symptoms.  Osteoarthritis left hip.    Improved today status post Replacement per Dr. Elias, advancing activity.  Hypertension.  Good control.  Check treadmill echo, carotid  Dopplers.  Discussed low-sodium diet.  Elevated blood sugar..  Improved, diet controlled.  Follow-up recheck.  Hyperlipidemia.  Overall improved on red yeast rice, good control, increase to 2400 mg daily..  Discussed diet, exercise, lifestyle modification.  Follow-up recheck.  KRISTA.  Doing well on CPAP.  Prostate screening.  PSA benign.  Family history of colonoscopy.  Mom,  in her 50s.  Colonoscopy normal , repeat planned 5 years.    Patient has been erroneously marked as diabetic. Based on the available clinical information, he does not have diabetes and should therefore be excluded from diabetic health maintenance and quality measures for the remainder of the reporting period.    Problem List Items Addressed This Visit        High    Lumbar disc disease    Relevant Medications    meloxicam (MOBIC) 15 MG tablet    Osteoarthritis of left hip       Unprioritized    Hypertension, benign (Chronic)    Relevant Medications    lisinopril (PRINIVIL,ZESTRIL) 20 MG tablet    Obstructive sleep apnea    Overview     Doing well, followed by cycle         Encounter for annual general medical examination with abnormal findings in adult - Primary    Overview     doing well, vaccines current  Age specific anticipatory guidance and warning signs discussed.  Diet, exercise, and lifestyle modification discussed.  Safety, seatbelts, and routine screening examinations discussed.  Discussed self-examinations.         Class 1 obesity due to excess calories without serious comorbidity with body mass index (BMI) of 32.0 to 32.9 in adult    Screening PSA (prostate specific antigen)    Overview     PSA/GABRIELA benign         Colon cancer screening    Overview     Colonoscopy current           Other Visit Diagnoses     Type 2 diabetes mellitus with hyperglycemia, without long-term current use of insulin (CMS/[facility])  (Chronic)       Relevant Orders    POCT urinalysis dipstick, manual (Completed)    Chest pain, unspecified type        Relevant  Orders    Adult Stress Echo W/ Cont or Stress Agent if Necessary Per Protocol    Carotid bruit, unspecified laterality        Relevant Orders    US Carotid Bilateral              Expected course, medications, and adverse effects discussed.  Call or return if worsening or persistent symptoms.  I wore protective equipment throughout this patient encounter including a mask, gloves, and eye protection.  Hand hygiene was performed before donning protective equipment and after removal when leaving the room.

## 2020-10-23 PROBLEM — R73.9 ELEVATED BLOOD SUGAR: Status: ACTIVE | Noted: 2020-10-23

## 2020-10-23 PROBLEM — E11.65 TYPE 2 DIABETES MELLITUS WITH HYPERGLYCEMIA, WITHOUT LONG-TERM CURRENT USE OF INSULIN: Chronic | Status: RESOLVED | Noted: 2019-08-12 | Resolved: 2020-10-23

## 2020-11-10 ENCOUNTER — OFFICE VISIT (OUTPATIENT)
Dept: NEUROLOGY | Facility: CLINIC | Age: 65
End: 2020-11-10

## 2020-11-10 VITALS
WEIGHT: 220.2 LBS | HEIGHT: 69 IN | BODY MASS INDEX: 32.61 KG/M2 | SYSTOLIC BLOOD PRESSURE: 126 MMHG | TEMPERATURE: 97.1 F | HEART RATE: 87 BPM | DIASTOLIC BLOOD PRESSURE: 78 MMHG

## 2020-11-10 DIAGNOSIS — E66.09 CLASS 1 OBESITY DUE TO EXCESS CALORIES WITHOUT SERIOUS COMORBIDITY WITH BODY MASS INDEX (BMI) OF 32.0 TO 32.9 IN ADULT: ICD-10-CM

## 2020-11-10 DIAGNOSIS — G47.33 OBSTRUCTIVE SLEEP APNEA SYNDROME: Primary | ICD-10-CM

## 2020-11-10 PROBLEM — M96.1 LUMBAR POST-LAMINECTOMY SYNDROME: Status: ACTIVE | Noted: 2020-11-10

## 2020-11-10 PROBLEM — M51.36 DEGENERATION OF LUMBAR INTERVERTEBRAL DISC: Status: ACTIVE | Noted: 2020-11-10

## 2020-11-10 PROCEDURE — 99213 OFFICE O/P EST LOW 20 MIN: CPT | Performed by: PSYCHIATRY & NEUROLOGY

## 2020-11-10 RX ORDER — TRIAMCINOLONE ACETONIDE 55 UG/1
2 SPRAY, METERED NASAL DAILY
COMMUNITY
Start: 2020-10-16

## 2020-11-10 NOTE — PROGRESS NOTES
Sleep medicine follow-up visit    Rudy Henriquez Jr.   1955  65 y.o. male   DATE OF SERVICE: 11/10/2020     KRISTA,f/u from [facility] with New CPAP machine, patient doing well with pap therapy.   Patient  uses a full face mask and been getting his supplies through [employer] but would like to change to another [employer].       On NPSG at [facility] , 11/07/2019 patient had Severe obstructive sleep apnea syndrome with apnea-hypopnea index of 25.6 per sleep hour, minimum SpO2 of 84%    The compliance data reviewed and the patient is on CPAP therapy at 6-16 cm/H2O and compliance data indicates excellent compliance with 93% usage for more than 4 hours with an average usage of 6 hours 55 minutes. AHI down to 5.5 with CPAP therapy and mean CPAP pressure 8.1 cm of water.    The patient's hypersomnia also resolved with [place] Sleepiness Scale score of 8 with CPAP therapy.  The patient feels great and is benefiting from it and is compliant.     Obesity, BMI-32.5, stable    Review of Systems   Constitutional: Negative for appetite change and fatigue.   HENT: Negative for sinus pressure and sinus pain.    Eyes: Negative for pain and itching.   Respiratory: Positive for apnea and shortness of breath. Negative for cough.    Cardiovascular: Negative for chest pain and palpitations.   Gastrointestinal: Negative for constipation and diarrhea.   Endocrine: Negative for cold intolerance and heat intolerance.   Genitourinary: Negative for difficulty urinating and frequency.   Musculoskeletal: Positive for back pain. Negative for neck pain.   Allergic/Immunologic: Negative for environmental allergies.   Neurological: Positive for numbness. Negative for dizziness, tremors, seizures, syncope, facial asymmetry, speech difficulty, weakness, light-headedness and headaches.   Psychiatric/Behavioral: Negative for agitation and confusion.     I reviewed and addressed ROS entered by MA.        The following portions of the patient's history were reviewed  and updated as appropriate: allergies, current medications, past family history, past medical history, past social history, past surgical history and problem list.      Family History   Problem Relation Age of Onset   • Colon cancer Mother    • Lung cancer Father    • Diabetes Sister        Past Medical History:   Diagnosis Date   • Acute reaction to stress    • Asthma    • Benign hypertension    • Hyperlipemia    • Sleep apnea    • Tremor        Social History     Socioeconomic History   • Marital status:      Spouse name: Not on file   • Number of children: Not on file   • Years of education: Not on file   • Highest education level: Not on file   Tobacco Use   • Smoking status: Never Smoker   • Smokeless tobacco: Never Used   Substance and Sexual Activity   • Alcohol use: No     Frequency: Never   • Drug use: No   • Sexual activity: Defer     Partners: Female     Comment:          Current Outpatient Medications:   •  aspirin 81 MG chewable tablet, Chew 81 mg Daily., Disp: , Rfl:   •  azelastine (ASTELIN) 0.1 % nasal spray, , Disp: , Rfl:   •  lisinopril (PRINIVIL,ZESTRIL) 20 MG tablet, Take 1 tablet by mouth Daily., Disp: 90 tablet, Rfl: 3  •  meloxicam (MOBIC) 15 MG tablet, Take 1 tablet by mouth Daily., Disp: 90 tablet, Rfl: 3  •  Multiple Vitamins-Minerals (MULTIVITAMIN ADULT PO), Take 1 tablet by mouth Daily., Disp: , Rfl:   •  Nystatin powder, Apply 1 application topically to the appropriate area as directed 2 (Two) Times a Day As Needed., Disp: , Rfl:   •  Red Yeast Rice 600 MG tablet, Take  by mouth., Disp: , Rfl:   •  Triamcinolone Acetonide (NASACORT) 55 MCG/ACT nasal inhaler, 2 sprays Daily., Disp: , Rfl:     Allergies   Allergen Reactions   • Povidone Iodine Rash     Severe rash   • Chlorhexidine Rash   • Oxycodone-Acetaminophen Swelling and Rash        PHYSICAL EXAMINATION:  Vitals:    11/10/20 1553   BP: 126/78   Pulse: 87   Temp: 97.1 °F (36.2 °C)      Body mass index is 32.52 kg/m².        HEENT: Normal.      EXTREMITIES: No edema.     IMPRESSION:     Patient with obstructive sleep apnea syndrome with hypersomnia successfully treated with CPAP therapy and is compliant and benefiting from it.     Obesity stable    RECOMMENDATIONS:   1. Continue present CPAP.  Change pressure to min 9max 16  2. Follow up 1 year.   3 pt encouraged to lose  weight    EPWORTH SLEEPINESS SCALE  Sitting and reading  1  WatchingTV  1  Sitting, inactive, in a public place  1  As a passenger in a car for 1 hour w/o a break  2  Lying down to rest in the afternoon  3  Sitting and talking to someone  0  Sitting quietly after a lunch  0  In a car, while stopped for traffic or a light  0  Total 8        This document has been electronically signed by Joseph Seipel, MD on November 10, 2020 16:17 EST

## 2020-11-12 ENCOUNTER — TELEPHONE (OUTPATIENT)
Dept: NEUROLOGY | Facility: CLINIC | Age: 65
End: 2020-11-12

## 2020-11-12 DIAGNOSIS — G47.33 OBSTRUCTIVE SLEEP APNEA: Primary | ICD-10-CM

## 2020-11-13 ENCOUNTER — HOSPITAL ENCOUNTER (OUTPATIENT)
Dept: CARDIOLOGY | Facility: HOSPITAL | Age: 65
Discharge: HOME | End: 2020-11-13

## 2020-11-13 ENCOUNTER — HOSPITAL ENCOUNTER (OUTPATIENT)
Dept: ULTRASOUND IMAGING | Facility: HOSPITAL | Age: 65
Discharge: HOME | End: 2020-11-13

## 2020-11-13 ENCOUNTER — PROCEDURE VISIT (OUTPATIENT)
Dept: FAMILY MEDICINE CLINIC | Facility: CLINIC | Age: 65
End: 2020-11-13

## 2020-11-13 VITALS — WEIGHT: 220.24 LBS | HEIGHT: 69 IN | BODY MASS INDEX: 32.62 KG/M2

## 2020-11-13 DIAGNOSIS — R09.89 CAROTID BRUIT, UNSPECIFIED LATERALITY: ICD-10-CM

## 2020-11-13 DIAGNOSIS — R07.9 CHEST PAIN, UNSPECIFIED TYPE: ICD-10-CM

## 2020-11-13 PROCEDURE — 93325 DOPPLER ECHO COLOR FLOW MAPG: CPT | Performed by: INTERNAL MEDICINE

## 2020-11-13 PROCEDURE — 93325 DOPPLER ECHO COLOR FLOW MAPG: CPT

## 2020-11-13 PROCEDURE — 93320 DOPPLER ECHO COMPLETE: CPT

## 2020-11-13 PROCEDURE — 93350 STRESS TTE ONLY: CPT | Performed by: INTERNAL MEDICINE

## 2020-11-13 PROCEDURE — 93320 DOPPLER ECHO COMPLETE: CPT | Performed by: INTERNAL MEDICINE

## 2020-11-13 PROCEDURE — 93016 CV STRESS TEST SUPVJ ONLY: CPT | Performed by: FAMILY MEDICINE

## 2020-11-13 PROCEDURE — 93017 CV STRESS TEST TRACING ONLY: CPT

## 2020-11-13 PROCEDURE — 93350 STRESS TTE ONLY: CPT

## 2020-11-13 PROCEDURE — 93018 CV STRESS TEST I&R ONLY: CPT | Performed by: FAMILY MEDICINE

## 2020-11-13 PROCEDURE — 93880 EXTRACRANIAL BILAT STUDY: CPT

## 2020-11-13 NOTE — PROGRESS NOTES
Adult Stress Test Report    11/13/2020     Requesting Physician: Charles Calderon MD    Study: exercise stress echo   Pre-test ECG: abnormal Flipped T waves aVL   Level of Stress:  97% age-predicted max HR   7.0 METS achieved   Functional Capacity: normal   Abnormal Symptoms: none   Heart Rate Response: normal   BP Response:  normal   Baseline LVEF: Echo pending %,    Stress ECG: no ECG changes   Stress Imaging Report:  pending     Impression:    Low probability of significant CAD.    Interpreted by:  Angely Canada MA 11/13/2020

## 2020-11-14 LAB
BH CV ECHO MEAS - ACS: 1.8 CM
BH CV ECHO MEAS - AI DEC SLOPE: 158.6 CM/SEC^2
BH CV ECHO MEAS - AI DEC TIME: 2.4 SEC
BH CV ECHO MEAS - AI MAX PG: 51.8 MMHG
BH CV ECHO MEAS - AI MAX VEL: 358.2 CM/SEC
BH CV ECHO MEAS - AI P1/2T: 661.6 MSEC
BH CV ECHO MEAS - AO MAX PG (FULL): 1.8 MMHG
BH CV ECHO MEAS - AO MAX PG: 9.3 MMHG
BH CV ECHO MEAS - AO MEAN PG (FULL): 1 MMHG
BH CV ECHO MEAS - AO MEAN PG: 4.7 MMHG
BH CV ECHO MEAS - AO ROOT AREA (BSA CORRECTED): 1.3
BH CV ECHO MEAS - AO ROOT AREA: 5.8 CM^2
BH CV ECHO MEAS - AO ROOT DIAM: 2.7 CM
BH CV ECHO MEAS - AO V2 MAX: 152.4 CM/SEC
BH CV ECHO MEAS - AO V2 MEAN: 101.2 CM/SEC
BH CV ECHO MEAS - AO V2 VTI: 30.6 CM
BH CV ECHO MEAS - AVA(I,A): 2.9 CM^2
BH CV ECHO MEAS - AVA(I,D): 2.9 CM^2
BH CV ECHO MEAS - AVA(V,A): 2.9 CM^2
BH CV ECHO MEAS - AVA(V,D): 2.9 CM^2
BH CV ECHO MEAS - BSA(HAYCOCK): 2.2 M^2
BH CV ECHO MEAS - BSA: 2.2 M^2
BH CV ECHO MEAS - BZI_BMI: 32.5 KILOGRAMS/M^2
BH CV ECHO MEAS - BZI_METRIC_HEIGHT: 175.3 CM
BH CV ECHO MEAS - BZI_METRIC_WEIGHT: 99.8 KG
BH CV ECHO MEAS - EDV(CUBED): 121.1 ML
BH CV ECHO MEAS - EDV(MOD-SP4): 69.4 ML
BH CV ECHO MEAS - EDV(TEICH): 115.4 ML
BH CV ECHO MEAS - EF(CUBED): 72.6 %
BH CV ECHO MEAS - EF(MOD-BP): 65 %
BH CV ECHO MEAS - EF(MOD-SP4): 69.8 %
BH CV ECHO MEAS - EF(TEICH): 64.1 %
BH CV ECHO MEAS - ESV(CUBED): 33.2 ML
BH CV ECHO MEAS - ESV(MOD-SP4): 21 ML
BH CV ECHO MEAS - ESV(TEICH): 41.4 ML
BH CV ECHO MEAS - FS: 35 %
BH CV ECHO MEAS - IVS/LVPW: 0.96
BH CV ECHO MEAS - IVSD: 1.1 CM
BH CV ECHO MEAS - LA DIMENSION: 3.6 CM
BH CV ECHO MEAS - LA/AO: 1.3
BH CV ECHO MEAS - LV DIASTOLIC VOL/BSA (35-75): 32.3 ML/M^2
BH CV ECHO MEAS - LV MASS(C)D: 203.5 GRAMS
BH CV ECHO MEAS - LV MASS(C)DI: 94.6 GRAMS/M^2
BH CV ECHO MEAS - LV MAX PG: 7.4 MMHG
BH CV ECHO MEAS - LV MEAN PG: 3.6 MMHG
BH CV ECHO MEAS - LV SYSTOLIC VOL/BSA (12-30): 9.8 ML/M^2
BH CV ECHO MEAS - LV V1 MAX: 136.5 CM/SEC
BH CV ECHO MEAS - LV V1 MEAN: 89.4 CM/SEC
BH CV ECHO MEAS - LV V1 VTI: 27.8 CM
BH CV ECHO MEAS - LVIDD: 4.9 CM
BH CV ECHO MEAS - LVIDS: 3.2 CM
BH CV ECHO MEAS - LVOT AREA: 3.2 CM^2
BH CV ECHO MEAS - LVOT DIAM: 2 CM
BH CV ECHO MEAS - LVPWD: 1.1 CM
BH CV ECHO MEAS - MR MAX PG: 86.7 MMHG
BH CV ECHO MEAS - MR MAX VEL: 463.8 CM/SEC
BH CV ECHO MEAS - MV A MAX VEL: 46.5 CM/SEC
BH CV ECHO MEAS - MV DEC SLOPE: 478.8 CM/SEC^2
BH CV ECHO MEAS - MV DEC TIME: 0.15 SEC
BH CV ECHO MEAS - MV E MAX VEL: 73.2 CM/SEC
BH CV ECHO MEAS - MV E/A: 1.6
BH CV ECHO MEAS - MV MAX PG: 2.8 MMHG
BH CV ECHO MEAS - MV MEAN PG: 0.88 MMHG
BH CV ECHO MEAS - MV V2 MAX: 83.3 CM/SEC
BH CV ECHO MEAS - MV V2 MEAN: 42.7 CM/SEC
BH CV ECHO MEAS - MV V2 VTI: 24.6 CM
BH CV ECHO MEAS - MVA(VTI): 3.6 CM^2
BH CV ECHO MEAS - PA ACC TIME: 0.08 SEC
BH CV ECHO MEAS - PA MAX PG: 7.7 MMHG
BH CV ECHO MEAS - PA PR(ACCEL): 43 MMHG
BH CV ECHO MEAS - PA V2 MAX: 138.6 CM/SEC
BH CV ECHO MEAS - PI END-D VEL: 72.1 CM/SEC
BH CV ECHO MEAS - PULM A REVS DUR: 0.13 SEC
BH CV ECHO MEAS - PULM A REVS VEL: 21.8 CM/SEC
BH CV ECHO MEAS - PULM DIAS VEL: 50.8 CM/SEC
BH CV ECHO MEAS - PULM S/D: 1.2
BH CV ECHO MEAS - PULM SYS VEL: 58.5 CM/SEC
BH CV ECHO MEAS - RAP SYSTOLE: 10 MMHG
BH CV ECHO MEAS - RVDD: 2.2 CM
BH CV ECHO MEAS - RVSP: 42.6 MMHG
BH CV ECHO MEAS - SI(AO): 82.1 ML/M^2
BH CV ECHO MEAS - SI(CUBED): 40.9 ML/M^2
BH CV ECHO MEAS - SI(LVOT): 41.6 ML/M^2
BH CV ECHO MEAS - SI(MOD-SP4): 22.5 ML/M^2
BH CV ECHO MEAS - SI(TEICH): 34.4 ML/M^2
BH CV ECHO MEAS - SV(AO): 176.6 ML
BH CV ECHO MEAS - SV(CUBED): 87.9 ML
BH CV ECHO MEAS - SV(LVOT): 89.6 ML
BH CV ECHO MEAS - SV(MOD-SP4): 48.4 ML
BH CV ECHO MEAS - SV(TEICH): 74 ML
BH CV ECHO MEAS - TR MAX VEL: 273 CM/SEC
BH CV STRESS BP STAGE 1: NORMAL
BH CV STRESS BP STAGE 2: NORMAL
BH CV STRESS BP STAGE 3: NORMAL
BH CV STRESS DURATION MIN STAGE 1: 3
BH CV STRESS DURATION MIN STAGE 2: 3
BH CV STRESS DURATION MIN STAGE 3: 0
BH CV STRESS DURATION SEC STAGE 1: 0
BH CV STRESS DURATION SEC STAGE 2: 0
BH CV STRESS DURATION SEC STAGE 3: 34
BH CV STRESS GRADE STAGE 1: 10
BH CV STRESS GRADE STAGE 2: 12
BH CV STRESS GRADE STAGE 3: 14
BH CV STRESS HR STAGE 1: 114
BH CV STRESS HR STAGE 2: 140
BH CV STRESS HR STAGE 3: 150
BH CV STRESS METS STAGE 1: 5
BH CV STRESS METS STAGE 2: 7.5
BH CV STRESS METS STAGE 3: 10
BH CV STRESS PROTOCOL 1: NORMAL
BH CV STRESS RECOVERY BP: NORMAL MMHG
BH CV STRESS RECOVERY HR: 89 BPM
BH CV STRESS SPEED STAGE 1: 1.7
BH CV STRESS SPEED STAGE 2: 2.5
BH CV STRESS SPEED STAGE 3: 3.4
BH CV STRESS STAGE 1: 1
BH CV STRESS STAGE 2: 2
BH CV STRESS STAGE 3: 3
MAXIMAL PREDICTED HEART RATE: 157 BPM
PERCENT MAX PREDICTED HR: 96.82 %
STRESS BASELINE BP: NORMAL MMHG
STRESS BASELINE HR: 76 BPM
STRESS PERCENT HR: 114 %
STRESS POST ESTIMATED WORKLOAD: 7 METS
STRESS POST EXERCISE DUR MIN: 6 MIN
STRESS POST EXERCISE DUR SEC: 34 SEC
STRESS POST PEAK BP: NORMAL MMHG
STRESS POST PEAK HR: 152 BPM
STRESS TARGET HR: 133 BPM

## 2020-11-16 ENCOUNTER — TELEPHONE (OUTPATIENT)
Dept: FAMILY MEDICINE CLINIC | Facility: CLINIC | Age: 65
End: 2020-11-16

## 2020-11-16 NOTE — TELEPHONE ENCOUNTER
----- Message from Charles Calderon MD sent at 11/14/2020  4:36 PM EST -----  Let him know the echocardiogram portion of his cardiac stress test is normal, his carotid ultrasound looks good also, he has just some mild blockage on both sides, nothing that will cause him any problems currently, plan to recheck this in 3 to 4 years, thanks

## 2021-10-08 DIAGNOSIS — I10 HYPERTENSION, BENIGN: Chronic | ICD-10-CM

## 2021-10-08 DIAGNOSIS — M51.9 LUMBAR DISC DISEASE: ICD-10-CM

## 2021-10-11 RX ORDER — LISINOPRIL 20 MG/1
20 TABLET ORAL DAILY
Qty: 90 TABLET | Refills: 0 | Status: SHIPPED | OUTPATIENT
Start: 2021-10-11 | End: 2022-01-14

## 2021-10-11 RX ORDER — MELOXICAM 15 MG/1
15 TABLET ORAL DAILY
Qty: 90 TABLET | Refills: 0 | Status: SHIPPED | OUTPATIENT
Start: 2021-10-11 | End: 2022-01-14

## 2021-10-21 ENCOUNTER — TRANSCRIBE ORDERS (OUTPATIENT)
Dept: ADMINISTRATIVE | Facility: HOSPITAL | Age: 66
End: 2021-10-21

## 2021-10-21 DIAGNOSIS — R93.7 MUSCULOSKELETAL SYSTEM IMAGING ABNORMALITY: Primary | ICD-10-CM

## 2021-10-29 RX ORDER — CHOLECALCIFEROL (VITAMIN D3) 125 MCG
5 CAPSULE ORAL
COMMUNITY

## 2021-10-29 RX ORDER — GABAPENTIN 300 MG/1
300 CAPSULE ORAL 3 TIMES DAILY
COMMUNITY
End: 2022-04-13

## 2021-11-05 ENCOUNTER — APPOINTMENT (OUTPATIENT)
Dept: CT IMAGING | Facility: HOSPITAL | Age: 66
End: 2021-11-05

## 2021-11-05 ENCOUNTER — HOSPITAL ENCOUNTER (OUTPATIENT)
Dept: GENERAL RADIOLOGY | Facility: HOSPITAL | Age: 66
Discharge: HOME | End: 2021-11-05

## 2021-11-10 ENCOUNTER — OFFICE VISIT (OUTPATIENT)
Dept: NEUROLOGY | Facility: CLINIC | Age: 66
End: 2021-11-10

## 2021-11-10 DIAGNOSIS — Z53.21 PATIENT LEFT WITHOUT BEING SEEN: Primary | ICD-10-CM

## 2021-11-11 ENCOUNTER — APPOINTMENT (OUTPATIENT)
Dept: BONE DENSITY | Facility: HOSPITAL | Age: 66
End: 2021-11-11

## 2021-11-11 NOTE — PROGRESS NOTES
Sleep medicine follow-up visit    Rudy Henriquez Jr.   1955  66 y.o. male   DATE OF SERVICE: 11/16/2021     KRISTA,f/u from [facility] with CPAP machine, patient doing well with pap therapy.     Patient  uses a full face mask and been getting his supplies through [employer].     On NPSG at [facility] , 11/07/2019 patient had Severe obstructive sleep apnea syndrome with apnea-hypopnea index of 25.6 per sleep hour, minimum SpO2 of 84%  The compliance data reviewed and the patient is on auto CPAP therapy at 9-16 cm/H2O and compliance data indicates excellent compliance with 95% usage for more than 4 hours with an average usage of 7 hours hours 15 minutes. AHI down to 3.3 with auto CPAP therapy and mean auto CPAP pressure 10.6 cm of water.  The patient's hypersomnia also resolved with [place] Sleepiness Scale score of 9 with CPAP therapy.  The patient feels great and is benefiting from it and is compliant.       EPWORTH SLEEPINESS SCALE  Sitting and reading 0 WatchingTV 0  Sitting, inactive, in a public place 0  As a passenger in a car for 1 hour w/o a break  3  Lying down to rest in the afternoon  3  Sitting and talking to someone  0  Sitting quietly after a lunch  3  In a car, while stopped for traffic or a light  0  Total 9      Review of Systems   Constitutional: Negative for fatigue and fever.   HENT: Negative for ear discharge and ear pain.    Eyes: Positive for itching. Negative for pain.   Respiratory: Negative for cough and shortness of breath.    Cardiovascular: Negative for chest pain.   Gastrointestinal: Negative for abdominal pain and nausea.   Endocrine: Negative for cold intolerance and heat intolerance.   Musculoskeletal: Positive for back pain. Negative for neck pain.   Neurological: Negative for dizziness and headaches.   Psychiatric/Behavioral: Negative for agitation, confusion and sleep disturbance.     I reviewed and addressed ROS entered by MA.    History of Present Illness    The following portions of the  patient's history were reviewed and updated as appropriate: allergies, current medications, past family history, past medical history, past social history, past surgical history and problem list.      Family History   Problem Relation Age of Onset   • Colon cancer Mother    • Lung cancer Father    • Diabetes Sister        Past Medical History:   Diagnosis Date   • Acute reaction to stress    • Asthma    • Benign hypertension    • Hyperlipemia    • Sleep apnea    • Tremor        Social History     Socioeconomic History   • Marital status:    Tobacco Use   • Smoking status: Never Smoker   • Smokeless tobacco: Never Used   Vaping Use   • Vaping Use: Never used   Substance and Sexual Activity   • Alcohol use: No   • Drug use: No   • Sexual activity: Defer     Partners: Female     Comment:          Current Outpatient Medications:   •  aspirin 81 MG chewable tablet, Chew 81 mg Daily., Disp: , Rfl:   •  azelastine (ASTELIN) 0.1 % nasal spray, , Disp: , Rfl:   •  gabapentin (NEURONTIN) 300 MG capsule, Take 300 mg by mouth 3 (Three) Times a Day., Disp: , Rfl:   •  lisinopril (PRINIVIL,ZESTRIL) 20 MG tablet, Take 1 tablet by mouth Daily. Patient will need appointment for further refills, Disp: 90 tablet, Rfl: 0  •  melatonin 5 MG tablet tablet, Take 5 mg by mouth., Disp: , Rfl:   •  meloxicam (MOBIC) 15 MG tablet, Take 1 tablet by mouth Daily. Patient will need appointment for further refills, Disp: 90 tablet, Rfl: 0  •  Multiple Vitamins-Minerals (MULTIVITAMIN ADULT PO), Take 1 tablet by mouth Daily., Disp: , Rfl:   •  Nystatin powder, Apply 1 application topically to the appropriate area as directed 2 (Two) Times a Day As Needed., Disp: , Rfl:   •  Red Yeast Rice 600 MG tablet, Take  by mouth., Disp: , Rfl:   •  Triamcinolone Acetonide (NASACORT) 55 MCG/ACT nasal inhaler, 2 sprays Daily., Disp: , Rfl:     Allergies   Allergen Reactions   • Povidone Iodine Rash     Severe rash   • Chlorhexidine Rash   •  Oxycodone-Acetaminophen Swelling and Rash        PHYSICAL EXAMINATION:  Vitals:    11/16/21 1550   BP: 137/80   Pulse: 78   Temp: 97.7 °F (36.5 °C)      Body mass index is 32.5 kg/m².       HEENT: Normal.    CARDIAC: Normal.   LUNGS: Clear to auscultation.   EXTREMITIES: No edema.     Diagnoses and all orders for this visit:    1. Obstructive sleep apnea (Primary)    He is aware of the sponge problems with the [employer] machines and states he has his machine on the list.  He has a past history of hypertension, hyperlipidemia, moderate sleep disordered breathing so it is not not in his best interest to stop using the machine.  He should continue to use the machine as prescribed until he receives the new machine.  He was notified to continue cleaning the machine as he has in the past not to use any ozone .  Use the machine every night at minimum of 4 to 6 hours.  Mask and tubing change every 3 months, to change monthly.     Return in about 1 year (around 11/16/2022) for Next scheduled follow up Lidia Oconnor.    I spent 15 minutes caring for Rudy on this date of service. This time includes time spent by me in the following activities: reviewing tests, obtaining and/or reviewing a separately obtained history, performing a medically appropriate examination and/or evaluation, counseling and educating the patient/family/caregiver, referring and communicating with other health care professionals and documenting information in the medical record.      This document has been electronically signed by Lidia STAHL on November 16, 2021 16:24 EST

## 2021-11-16 ENCOUNTER — OFFICE VISIT (OUTPATIENT)
Dept: NEUROLOGY | Facility: CLINIC | Age: 66
End: 2021-11-16

## 2021-11-16 VITALS
DIASTOLIC BLOOD PRESSURE: 80 MMHG | HEART RATE: 78 BPM | BODY MASS INDEX: 32.5 KG/M2 | SYSTOLIC BLOOD PRESSURE: 137 MMHG | TEMPERATURE: 97.7 F | HEIGHT: 69 IN

## 2021-11-16 DIAGNOSIS — G47.33 OBSTRUCTIVE SLEEP APNEA: Primary | ICD-10-CM

## 2021-11-16 PROCEDURE — 99212 OFFICE O/P EST SF 10 MIN: CPT | Performed by: NURSE PRACTITIONER

## 2021-11-19 ENCOUNTER — APPOINTMENT (OUTPATIENT)
Dept: CT IMAGING | Facility: HOSPITAL | Age: 66
End: 2021-11-19

## 2021-11-19 ENCOUNTER — APPOINTMENT (OUTPATIENT)
Dept: GENERAL RADIOLOGY | Facility: HOSPITAL | Age: 66
End: 2021-11-19

## 2022-01-10 DIAGNOSIS — I10 HYPERTENSION, BENIGN: Chronic | ICD-10-CM

## 2022-01-11 DIAGNOSIS — M51.9 LUMBAR DISC DISEASE: ICD-10-CM

## 2022-01-14 ENCOUNTER — TELEPHONE (OUTPATIENT)
Dept: FAMILY MEDICINE CLINIC | Facility: CLINIC | Age: 67
End: 2022-01-14

## 2022-01-14 RX ORDER — MELOXICAM 15 MG/1
15 TABLET ORAL DAILY
Qty: 90 TABLET | Refills: 1 | Status: SHIPPED | OUTPATIENT
Start: 2022-01-14 | End: 2022-07-27

## 2022-01-14 RX ORDER — LISINOPRIL 20 MG/1
20 TABLET ORAL DAILY
Qty: 90 TABLET | Refills: 0 | Status: SHIPPED | OUTPATIENT
Start: 2022-01-14 | End: 2022-04-18

## 2022-01-14 NOTE — TELEPHONE ENCOUNTER
I sent a refill on his meloxicam, get him scheduled back in for physical, fasting five panel ahead, thanks

## 2022-02-20 PROCEDURE — 87086 URINE CULTURE/COLONY COUNT: CPT | Performed by: NURSE PRACTITIONER

## 2022-03-14 ENCOUNTER — OFFICE VISIT (OUTPATIENT)
Dept: FAMILY MEDICINE CLINIC | Facility: CLINIC | Age: 67
End: 2022-03-14

## 2022-03-14 VITALS
WEIGHT: 204 LBS | DIASTOLIC BLOOD PRESSURE: 62 MMHG | TEMPERATURE: 97.1 F | RESPIRATION RATE: 18 BRPM | HEIGHT: 68 IN | BODY MASS INDEX: 30.92 KG/M2 | HEART RATE: 96 BPM | OXYGEN SATURATION: 98 % | SYSTOLIC BLOOD PRESSURE: 118 MMHG

## 2022-03-14 DIAGNOSIS — E78.2 MIXED HYPERLIPIDEMIA: ICD-10-CM

## 2022-03-14 DIAGNOSIS — I10 HYPERTENSION, BENIGN: Chronic | ICD-10-CM

## 2022-03-14 DIAGNOSIS — M54.16 CHRONIC RADICULAR LUMBAR PAIN: Primary | ICD-10-CM

## 2022-03-14 DIAGNOSIS — R30.0 DYSURIA: ICD-10-CM

## 2022-03-14 DIAGNOSIS — G89.29 CHRONIC RADICULAR LUMBAR PAIN: Primary | ICD-10-CM

## 2022-03-14 DIAGNOSIS — E66.09 CLASS 1 OBESITY DUE TO EXCESS CALORIES WITHOUT SERIOUS COMORBIDITY WITH BODY MASS INDEX (BMI) OF 32.0 TO 32.9 IN ADULT: ICD-10-CM

## 2022-03-14 DIAGNOSIS — M51.9 LUMBAR DISC DISEASE: ICD-10-CM

## 2022-03-14 PROBLEM — M43.10 DEGENERATIVE SPONDYLOLISTHESIS: Status: ACTIVE | Noted: 2022-01-05

## 2022-03-14 LAB
BILIRUB BLD-MCNC: NEGATIVE MG/DL
CLARITY, POC: CLEAR
COLOR UR: YELLOW
GLUCOSE UR STRIP-MCNC: NEGATIVE MG/DL
KETONES UR QL: NEGATIVE
LEUKOCYTE EST, POC: NEGATIVE
NITRITE UR-MCNC: NEGATIVE MG/ML
PH UR: 5 [PH] (ref 5–8)
PROT UR STRIP-MCNC: NEGATIVE MG/DL
RBC # UR STRIP: NEGATIVE /UL
SP GR UR: 1.02 (ref 1–1.03)
UROBILINOGEN UR QL: NORMAL

## 2022-03-14 PROCEDURE — 99214 OFFICE O/P EST MOD 30 MIN: CPT | Performed by: FAMILY MEDICINE

## 2022-03-14 PROCEDURE — 81002 URINALYSIS NONAUTO W/O SCOPE: CPT | Performed by: FAMILY MEDICINE

## 2022-03-14 RX ORDER — GABAPENTIN 600 MG/1
TABLET ORAL
COMMUNITY
Start: 2022-01-25 | End: 2022-03-14

## 2022-03-14 NOTE — PROGRESS NOTES
Subjective   Rudy Henriquez Jr. is a 66 y.o. male.     Chief Complaint   Patient presents with   • s/p Spinal surgery    • ER followup   • Dysuria       Ed reports he had spinal surg on 2/15/22     Edbulmaro was seen at [facility] on 2/20/22. He was seen for dysuria. Labs that were performed during the encounter included: CMP-abnormal, CBC-abnormal and urinalysis-abnormal. Diagnostic studies that were performed included: none. Medication changes: doxycycline.    Dysuria   This is a new problem. The current episode started 1 to 4 weeks ago. The problem has been resolved. The pain is at a severity of 3/10. The pain is mild. The maximum temperature recorded prior to his arrival was 101 - 101.9 F. The fever has been present for less than 1 day. Pertinent negatives include no chills, flank pain, hesitancy, nausea, urgency or vomiting.            I personally reviewed and updated the patient's allergies, medications, problem list, and past medical, surgical, social, and family history. I have reviewed and confirmed the accuracy of the History of Present Illness and Review of Symptoms as documented by the MA/LPN/RN. Charles Calderon MD    Family History   Problem Relation Age of Onset   • Colon cancer Mother    • Lung cancer Father    • Diabetes Sister        Social History     Tobacco Use   • Smoking status: Never Smoker   • Smokeless tobacco: Never Used   Vaping Use   • Vaping Use: Never used   Substance Use Topics   • Alcohol use: No   • Drug use: No       Past Surgical History:   Procedure Laterality Date   • BACK SURGERY  06/07/2018    on L4 and L5 by dr landa   • BACK SURGERY  07/13/2017    L4-L5 Dr. Landa Widen spinal column   • HIP SURGERY Left 03/04/2020   • ORIF CALCANEAL FRACTURE Left 05/2018    [facility]   • SPINE SURGERY N/A 02/15/2022    Dr Delgado @ [facility] spine   • TONSILLECTOMY AND ADENOIDECTOMY      at age 48       Patient Active Problem List   Diagnosis   • Acute reaction to stress   • Asthma   •  Obstructive sleep apnea   • Acute left-sided low back pain with right-sided sciatica   • Benign neoplasm of colon   • Carpal tunnel syndrome   • Encounter for annual general medical examination with abnormal findings in adult   • Hyperlipidemia   • Hypertension, benign   • Impotence of organic origin   • Lumbar disc disease   • Sleep apnea   • Tremor   • Memory loss   • Class 1 obesity due to excess calories without serious comorbidity with body mass index (BMI) of 32.0 to 32.9 in adult   • Osteoarthritis of left hip   • Atopic dermatitis   • Screening PSA (prostate specific antigen)   • Colon cancer screening   • Chronic radicular lumbar pain   • Primary osteoarthritis of left hip   • Elevated blood sugar   • Lumbar post-laminectomy syndrome   • Degeneration of lumbar intervertebral disc   • Degenerative spondylolisthesis         Current Outpatient Medications:   •  aspirin 81 MG chewable tablet, Chew 81 mg Daily., Disp: , Rfl:   •  azelastine (ASTELIN) 0.1 % nasal spray, , Disp: , Rfl:   •  gabapentin (NEURONTIN) 300 MG capsule, Take 300 mg by mouth 3 (Three) Times a Day., Disp: , Rfl:   •  lisinopril (PRINIVIL,ZESTRIL) 20 MG tablet, TAKE 1 TABLET BY MOUTH DAILY. PATIENT WILL NEED APPOINTMENT FOR FURTHER REFILLS, Disp: 90 tablet, Rfl: 0  •  melatonin 5 MG tablet tablet, Take 5 mg by mouth., Disp: , Rfl:   •  Multiple Vitamins-Minerals (MULTIVITAMIN ADULT PO), Take 1 tablet by mouth Daily., Disp: , Rfl:   •  Nystatin powder, Apply 1 application topically to the appropriate area as directed 2 (Two) Times a Day As Needed., Disp: , Rfl:   •  Red Yeast Rice 600 MG tablet, Take  by mouth., Disp: , Rfl:   •  Triamcinolone Acetonide (NASACORT) 55 MCG/ACT nasal inhaler, 2 sprays Daily., Disp: , Rfl:   •  HYDROcodone-acetaminophen (NORCO) 7.5-325 MG per tablet, Take 2 tablets by mouth., Disp: , Rfl:   •  meloxicam (MOBIC) 15 MG tablet, TAKE 1 TABLET BY MOUTH DAILY. PATIENT WILL NEED APPOINTMENT FOR FURTHER REFILLS, Disp: 90  "tablet, Rfl: 1  •  phenazopyridine (Pyridium) 100 MG tablet, Take 1 tablet by mouth 3 (Three) Times a Day., Disp: 6 tablet, Rfl: 0         Review of Systems   Constitutional: Negative for chills and diaphoresis.   Eyes: Negative for visual disturbance.   Respiratory: Negative for shortness of breath.    Cardiovascular: Negative for chest pain and palpitations.   Gastrointestinal: Negative for abdominal pain, nausea and vomiting.   Endocrine: Negative for polydipsia and polyphagia.   Genitourinary: Positive for dysuria. Negative for flank pain, hesitancy and urgency.   Musculoskeletal: Negative for neck stiffness.   Skin: Negative for color change and pallor.   Neurological: Negative for seizures and syncope.   Hematological: Negative for adenopathy.   Psychiatric/Behavioral: Negative for hallucinations and suicidal ideas.       I have reviewed and confirmed the accuracy of the ROS as documented by the MA/LPN/RN Charles Calderon MD      Objective   /62   Pulse 96   Temp 97.1 °F (36.2 °C)   Resp 18   Ht 172.7 cm (68\")   Wt 92.5 kg (204 lb)   SpO2 98%   BMI 31.02 kg/m²   BP Readings from Last 3 Encounters:   03/14/22 118/62   02/20/22 123/73   11/16/21 137/80     Wt Readings from Last 3 Encounters:   03/14/22 92.5 kg (204 lb)   02/20/22 95.3 kg (210 lb)   11/13/20 99.9 kg (220 lb 3.8 oz)     Physical Exam  Constitutional:       Appearance: Normal appearance. He is well-developed. He is not diaphoretic.   Cardiovascular:      Rate and Rhythm: Normal rate and regular rhythm.      Pulses: Normal pulses.      Heart sounds: Normal heart sounds, S1 normal and S2 normal. No murmur heard.    No friction rub. No gallop.   Pulmonary:      Effort: Pulmonary effort is normal. No accessory muscle usage.      Breath sounds: Normal breath sounds. No stridor. No decreased breath sounds, wheezing, rhonchi or rales.   Abdominal:      General: Bowel sounds are normal. There is no distension.      Palpations: Abdomen is soft. " Abdomen is not rigid. There is no mass or pulsatile mass.      Tenderness: There is no abdominal tenderness. There is no guarding or rebound. Negative signs include Saenz's sign.      Hernia: No hernia is present.   Skin:     General: Skin is warm and dry.      Coloration: Skin is not pale.      Comments: Lumbar and abdominal incisions well-healed   Neurological:      Mental Status: He is alert and oriented to person, place, and time.      Coordination: Coordination normal.      Gait: Gait normal.         Data / Lab Results:    Hemoglobin A1C   Date Value Ref Range Status   02/16/2022 6.0 (H) 4.3 - 5.6 % Final     Comment:     (note)  A1C% Reference Range:  4.3 - 5.6  Normal range  5.7 - 6.4  Pre-diabetic -increased risk for developing diabetes mellitus.  >=6.5      Diabetic -diagnostic of diabetes mellitus.    Note: For diagnosis of diabetes in individuals without unequivocal   hyperglycemia, results should be confirmed by repeat testing.    Patients with conditions that shorten erythrocyte survival, such as   recovery from acute blood loss, hemolytic anemia, kidney disease,   or the presence of unstable hemogloblins like HbSS, HbCC, and HbSC   may yield falsely decreased HbA1c test results. Iron deficiency may   yield falsely increased HbA1c test results.   08/13/2019 5.9 % Final   10/22/2018 5.7 4.6 - 7.1 % Final   07/06/2018 5.8 4.6 - 7.1 % Final     Lab Results   Component Value Date     (H) 02/21/2020     Lab Results   Component Value Date     (H) 10/16/2020     (H) 08/16/2019     03/19/2018     Lab Results   Component Value Date    CHOL 184 03/19/2018    CHOL 204 (H) 02/22/2017     Lab Results   Component Value Date    TRIG 87 10/16/2020    TRIG 83 08/16/2019    TRIG 131 03/19/2018     Lab Results   Component Value Date    HDL 40 10/16/2020    HDL 39 (L) 08/16/2019    HDL 38 (L) 03/19/2018     Lab Results   Component Value Date    PSA 0.3 10/16/2020    PSA 0.4 08/16/2019    PSA  0.4 03/19/2018     Lab Results   Component Value Date    WBC 6.33 02/20/2022    HGB 12.6 (L) 02/20/2022    HCT 37.7 (L) 02/20/2022    MCV 98.2 02/20/2022     02/20/2022     Lab Results   Component Value Date    TSH 3.860 10/16/2020      Lab Results   Component Value Date    GLUCOSE 127 (H) 10/16/2020    BUN 22 10/16/2020    CREATININE 0.95 10/16/2020    EGFRIFNONA 85 10/16/2020    EGFRIFAFRI 98 10/16/2020    BCR 23 10/16/2020    K 4.3 10/16/2020    CO2 26 10/16/2020    CALCIUM 9.4 10/16/2020    PROTENTOTREF 6.9 10/16/2020    ALBUMIN 4.5 10/16/2020    LABIL2 1.9 10/16/2020    AST 34 10/16/2020    ALT 38 10/16/2020     No results found for: RADHA, RF, SEDRATE   No results found for: CRP   No results found for: IRON, TIBC, FERRITIN   No results found for: [id number]       Assessment/Plan      Medications        Problem List         LOS      Health maintenance.  Doing well, vaccines current.  Discussed coated baby aspirin daily.  Discussed health maintenance, screening test, lifestyle modification.  Lumbar disc disease.      Improving today status post fusion L5-S1 Dr Delgado on 2/15, advancing activity, has completed course PT.. .  Positive rIght lower extremity radiculopathy.  Status post lumbar disc surgery by to Dr. Mark.  Postop hemoglobin 13.5, follow-up recheck.  Left knee pain.  Improved/resolved.  Flare OA versus medial meniscus tear.  Recommend joint injection, MRI, orthopedics referral he states he will consider.  Ice, NSAIDs, rehabilitation exercises discussed.  Call or return if persistent symptoms.  Osteoarthritis left hip.    Improved today status post Replacement per Dr. Elias, advancing activity.  Hypertension.  Improved, normotensive today, has lost weight.  Decrease lisinopril to 10 mg daily.  Monitor blood pressure at home.  Follow-up recheck.  Treadmill echo benign 2021.  Carotid stenosis.  Less than 50% obstruction bilaterally per carotid Dopplers 2021.  Continue risk factor  reduction.  Elevated blood sugar..  Improved, diet controlled.  Follow-up recheck.  A1c 6.0 inpatient .  Hyperlipidemia.  Overall improved on red yeast rice, good control, increase to 2400 mg daily..  Discussed diet, exercise, lifestyle modification.  Follow-up recheck.  KRISTA.  Doing well on CPAP.  Prostate screening.  PSA benign.  Family history of colonoscopy.  Mom,  in her 50s.  Colonoscopy normal , repeat planned 5 years.  Prostatitis.  Postop.  Clinically improved/resolved currently, has completed course doxycycline, repeat urine culture.  Folliculitis.  Possible allergic reaction to surgery prep, positive prior episodes./Dressing.  Improved/resolved currently has completed course antibiotics.  Follow-up visit comprehensive physical exam screening test scheduled.      Diagnoses and all orders for this visit:    1. Chronic radicular lumbar pain (Primary)    2. Dysuria  -     POCT urinalysis dipstick, manual  -     Urine Culture - Urine, Urine, Random Void    3. Class 1 obesity due to excess calories without serious comorbidity with body mass index (BMI) of 32.0 to 32.9 in adult    4. Lumbar disc disease    5. Hypertension, benign    6. Mixed hyperlipidemia              Expected course, medications, and adverse effects discussed.  Call or return if worsening or persistent symptoms.  I wore protective equipment throughout this patient encounter including a mask, gloves, and eye protection.  Hand hygiene was performed before donning protective equipment and after removal when leaving the room. The complete contents of the Assessment and Plan and Data/Lab Results as documented above have been reviewed and addressed by myself with the patient today as part of an ongoing evaluation / treatment plan.  If some of the documentation has been copied from a previous note and is unchanged it indicates that this problem / plan has been assessed today but is stable from a previous visit and no changes have been  recommended.

## 2022-03-16 LAB
BACTERIA UR CULT: NO GROWTH
BACTERIA UR CULT: NORMAL

## 2022-04-08 DIAGNOSIS — E55.9 VITAMIN D DEFICIENCY: ICD-10-CM

## 2022-04-08 DIAGNOSIS — Z12.5 SCREENING PSA (PROSTATE SPECIFIC ANTIGEN): ICD-10-CM

## 2022-04-08 DIAGNOSIS — E78.2 MIXED HYPERLIPIDEMIA: Primary | ICD-10-CM

## 2022-04-08 DIAGNOSIS — I10 HYPERTENSION, BENIGN: ICD-10-CM

## 2022-04-10 LAB
25(OH)D3+25(OH)D2 SERPL-MCNC: 43.9 NG/ML (ref 30–100)
ALBUMIN SERPL-MCNC: 4.7 G/DL (ref 3.8–4.8)
ALBUMIN/GLOB SERPL: 1.7 {RATIO} (ref 1.2–2.2)
ALP SERPL-CCNC: 124 IU/L (ref 44–121)
ALT SERPL-CCNC: 25 IU/L (ref 0–44)
AST SERPL-CCNC: 24 IU/L (ref 0–40)
BASOPHILS # BLD AUTO: 0.1 X10E3/UL (ref 0–0.2)
BASOPHILS NFR BLD AUTO: 1 %
BILIRUB SERPL-MCNC: 0.5 MG/DL (ref 0–1.2)
BUN SERPL-MCNC: 18 MG/DL (ref 8–27)
BUN/CREAT SERPL: 21 (ref 10–24)
CALCIUM SERPL-MCNC: 10 MG/DL (ref 8.6–10.2)
CHLORIDE SERPL-SCNC: 98 MMOL/L (ref 96–106)
CHOLEST SERPL-MCNC: 181 MG/DL (ref 100–199)
CHOLEST/HDLC SERPL: 4.2 RATIO (ref 0–5)
CO2 SERPL-SCNC: 22 MMOL/L (ref 20–29)
CREAT SERPL-MCNC: 0.86 MG/DL (ref 0.76–1.27)
EGFRCR SERPLBLD CKD-EPI 2021: 97 ML/MIN/1.73
EOSINOPHIL # BLD AUTO: 0.2 X10E3/UL (ref 0–0.4)
EOSINOPHIL NFR BLD AUTO: 4 %
ERYTHROCYTE [DISTWIDTH] IN BLOOD BY AUTOMATED COUNT: 12 % (ref 11.6–15.4)
GLOBULIN SER CALC-MCNC: 2.7 G/DL (ref 1.5–4.5)
GLUCOSE SERPL-MCNC: 119 MG/DL (ref 65–99)
HCT VFR BLD AUTO: 46 % (ref 37.5–51)
HDLC SERPL-MCNC: 43 MG/DL
HGB BLD-MCNC: 15.4 G/DL (ref 13–17.7)
IMM GRANULOCYTES # BLD AUTO: 0 X10E3/UL (ref 0–0.1)
IMM GRANULOCYTES NFR BLD AUTO: 0 %
LDLC SERPL CALC-MCNC: 117 MG/DL (ref 0–99)
LYMPHOCYTES # BLD AUTO: 1.6 X10E3/UL (ref 0.7–3.1)
LYMPHOCYTES NFR BLD AUTO: 31 %
MCH RBC QN AUTO: 32.1 PG (ref 26.6–33)
MCHC RBC AUTO-ENTMCNC: 33.5 G/DL (ref 31.5–35.7)
MCV RBC AUTO: 96 FL (ref 79–97)
MONOCYTES # BLD AUTO: 0.5 X10E3/UL (ref 0.1–0.9)
MONOCYTES NFR BLD AUTO: 10 %
NEUTROPHILS # BLD AUTO: 2.8 X10E3/UL (ref 1.4–7)
NEUTROPHILS NFR BLD AUTO: 54 %
PLATELET # BLD AUTO: 316 X10E3/UL (ref 150–450)
POTASSIUM SERPL-SCNC: 4.3 MMOL/L (ref 3.5–5.2)
PROT SERPL-MCNC: 7.4 G/DL (ref 6–8.5)
PSA SERPL-MCNC: 0.4 NG/ML (ref 0–4)
RBC # BLD AUTO: 4.8 X10E6/UL (ref 4.14–5.8)
SODIUM SERPL-SCNC: 138 MMOL/L (ref 134–144)
TRIGL SERPL-MCNC: 119 MG/DL (ref 0–149)
TSH SERPL DL<=0.005 MIU/L-ACNC: 3.86 UIU/ML (ref 0.45–4.5)
VLDLC SERPL CALC-MCNC: 21 MG/DL (ref 5–40)
WBC # BLD AUTO: 5.2 X10E3/UL (ref 3.4–10.8)

## 2022-04-13 ENCOUNTER — OFFICE VISIT (OUTPATIENT)
Dept: FAMILY MEDICINE CLINIC | Facility: CLINIC | Age: 67
End: 2022-04-13

## 2022-04-13 VITALS
WEIGHT: 208 LBS | HEART RATE: 85 BPM | OXYGEN SATURATION: 98 % | RESPIRATION RATE: 16 BRPM | TEMPERATURE: 97.3 F | BODY MASS INDEX: 31.52 KG/M2 | HEIGHT: 68 IN | SYSTOLIC BLOOD PRESSURE: 122 MMHG | DIASTOLIC BLOOD PRESSURE: 64 MMHG

## 2022-04-13 DIAGNOSIS — Z12.5 SCREENING PSA (PROSTATE SPECIFIC ANTIGEN): ICD-10-CM

## 2022-04-13 DIAGNOSIS — I10 HYPERTENSION, BENIGN: Chronic | ICD-10-CM

## 2022-04-13 DIAGNOSIS — G47.00 INSOMNIA, UNSPECIFIED TYPE: ICD-10-CM

## 2022-04-13 DIAGNOSIS — R73.9 ELEVATED BLOOD SUGAR: ICD-10-CM

## 2022-04-13 DIAGNOSIS — Z12.11 COLON CANCER SCREENING: ICD-10-CM

## 2022-04-13 DIAGNOSIS — E78.2 MIXED HYPERLIPIDEMIA: ICD-10-CM

## 2022-04-13 DIAGNOSIS — Z00.01 ENCOUNTER FOR ANNUAL GENERAL MEDICAL EXAMINATION WITH ABNORMAL FINDINGS IN ADULT: Primary | ICD-10-CM

## 2022-04-13 DIAGNOSIS — E66.09 CLASS 1 OBESITY DUE TO EXCESS CALORIES WITHOUT SERIOUS COMORBIDITY WITH BODY MASS INDEX (BMI) OF 32.0 TO 32.9 IN ADULT: ICD-10-CM

## 2022-04-13 PROCEDURE — 99213 OFFICE O/P EST LOW 20 MIN: CPT | Performed by: FAMILY MEDICINE

## 2022-04-13 PROCEDURE — 99396 PREV VISIT EST AGE 40-64: CPT | Performed by: FAMILY MEDICINE

## 2022-04-13 RX ORDER — TRAZODONE HYDROCHLORIDE 100 MG/1
50-100 TABLET ORAL NIGHTLY PRN
Qty: 90 TABLET | Refills: 3 | Status: SHIPPED | OUTPATIENT
Start: 2022-04-13

## 2022-04-13 RX ORDER — OMEGA-3S/DHA/EPA/FISH OIL/D3 300MG-1000
400 CAPSULE ORAL DAILY
COMMUNITY

## 2022-04-13 NOTE — PROGRESS NOTES
Subjective   Rudy Henriquez Jr. is a 66 y.o. male.     Chief Complaint   Patient presents with   • Annual Exam       The patient is here: for coordination of medical care to discuss health maintenance and disease prevention.  Last comprehensive physical was on 2020.  Previous physical was performed by  Charles Calderon MD  Overall has: moderate activity with work/home activities, exercises 2 - 3 times per week, good appetite, feels well with minor complaints and is sleeping poorly. Nutrition: appropriate balanced diet, eating a variety of foods and supplemental vitamins. Last tetanus shot was 03/12/2019. Patient's last PSA was: 04/09/2022    Last A1C was 6.0 on 2/16/22    Hypertension  This is a chronic problem. The current episode started more than 1 year ago. The problem is unchanged. The problem is uncontrolled. Pertinent negatives include no chest pain, neck pain, palpitations or shortness of breath. Risk factors for coronary artery disease include obesity, male gender and diabetes mellitus. Current antihypertension treatment includes ACE inhibitors. The current treatment provides moderate improvement. There are no compliance problems.    Blood Sugar Problem  Pertinent negatives include no abdominal pain, chest pain, chills, diaphoresis, nausea or neck pain.        Recent Hospitalizations:  Recently treated at the following:  Other: [facility]   02/2022.  [facility]      I personally reviewed and updated the patient's allergies, medications, problem list, and past medical, surgical, social, and family history. I have reviewed and confirmed the accuracy of the HPI and ROS as documented by the MA/LPN/RN Charles Calderon MD    Family History   Problem Relation Age of Onset   • Colon cancer Mother    • Lung cancer Father    • Diabetes Sister        Social History     Tobacco Use   • Smoking status: Never Smoker   • Smokeless tobacco: Never Used   Vaping Use   • Vaping Use: Never used   Substance Use Topics   • Alcohol  use: No   • Drug use: No       Past Surgical History:   Procedure Laterality Date   • BACK SURGERY  06/07/2018    on L4 and L5 by dr mark   • BACK SURGERY  07/13/2017    L4-L5 Dr. Mark Widen spinal column   • HIP SURGERY Left 03/04/2020   • ORIF CALCANEAL FRACTURE Left 05/2018    [facility], [place]   • SPINE SURGERY N/A 02/15/2022    Dr Delgado @ [facility] spine   • TONSILLECTOMY AND ADENOIDECTOMY      at age 48       Patient Active Problem List   Diagnosis   • Acute reaction to stress   • Asthma   • Obstructive sleep apnea   • Acute left-sided low back pain with right-sided sciatica   • Benign neoplasm of colon   • Carpal tunnel syndrome   • Encounter for annual general medical examination with abnormal findings in adult   • Hyperlipidemia   • Hypertension, benign   • Impotence of organic origin   • Lumbar disc disease   • Sleep apnea   • Tremor   • Memory loss   • Class 1 obesity due to excess calories without serious comorbidity with body mass index (BMI) of 32.0 to 32.9 in adult   • Osteoarthritis of left hip   • Atopic dermatitis   • Screening PSA (prostate specific antigen)   • Colon cancer screening   • Chronic radicular lumbar pain   • Primary osteoarthritis of left hip   • Elevated blood sugar   • Lumbar post-laminectomy syndrome   • Degeneration of lumbar intervertebral disc   • Degenerative spondylolisthesis         Current Outpatient Medications:   •  aspirin 81 MG chewable tablet, Chew 81 mg Daily., Disp: , Rfl:   •  azelastine (ASTELIN) 0.1 % nasal spray, , Disp: , Rfl:   •  cholecalciferol (VITAMIN D3) 10 MCG (400 UNIT) tablet, Take 400 Units by mouth Daily., Disp: , Rfl:   •  melatonin 5 MG tablet tablet, Take 5 mg by mouth., Disp: , Rfl:   •  Multiple Vitamins-Minerals (MULTIVITAMIN ADULT PO), Take 1 tablet by mouth Daily., Disp: , Rfl:   •  Red Yeast Rice 600 MG tablet, Take  by mouth., Disp: , Rfl:   •  Triamcinolone Acetonide (NASACORT) 55 MCG/ACT nasal inhaler, 2 sprays Daily., Disp: , Rfl:   •   "lisinopril (PRINIVIL,ZESTRIL) 20 MG tablet, TAKE 1 TABLET BY MOUTH DAILY. PATIENT WILL NEED APPOINTMENT FOR FURTHER REFILLS, Disp: 90 tablet, Rfl: 0  •  meloxicam (MOBIC) 15 MG tablet, TAKE 1 TABLET BY MOUTH DAILY. PATIENT WILL NEED APPOINTMENT FOR FURTHER REFILLS, Disp: 90 tablet, Rfl: 1  •  Nystatin powder, Apply 1 application topically to the appropriate area as directed 2 (Two) Times a Day As Needed., Disp: , Rfl:   •  traZODone (DESYREL) 100 MG tablet, Take 0.5-1 tablets by mouth At Night As Needed for Sleep., Disp: 90 tablet, Rfl: 3    Review of Systems   Constitutional: Negative for chills and diaphoresis.   HENT: Negative for trouble swallowing and voice change.    Eyes: Negative for visual disturbance.   Respiratory: Negative for shortness of breath.    Cardiovascular: Negative for chest pain and palpitations.   Gastrointestinal: Negative for abdominal pain and nausea.   Endocrine: Negative for polydipsia and polyphagia.   Genitourinary: Negative for hematuria.   Musculoskeletal: Negative for neck pain and neck stiffness.   Skin: Negative for color change and pallor.   Allergic/Immunologic: Negative for immunocompromised state.   Neurological: Negative for seizures and syncope.   Hematological: Negative for adenopathy.   Psychiatric/Behavioral: Negative for sleep disturbance and suicidal ideas.       I have reviewed and confirmed the accuracy of the ROS as documented by the MA/LPN/RN Charles Calderon MD      Objective   /64 (BP Location: Right arm, Patient Position: Sitting, Cuff Size: Large Adult)   Pulse 85   Temp 97.3 °F (36.3 °C) (Skin)   Resp 16   Ht 172.7 cm (68\")   Wt 94.3 kg (208 lb)   SpO2 98%   BMI 31.63 kg/m²   BP Readings from Last 3 Encounters:   04/13/22 122/64   03/14/22 118/62   02/20/22 123/73     Wt Readings from Last 3 Encounters:   04/13/22 94.3 kg (208 lb)   03/14/22 92.5 kg (204 lb)   02/20/22 95.3 kg (210 lb)     Physical Exam  Constitutional:       Appearance: He is " well-developed. He is not diaphoretic.   HENT:      Head: Normocephalic.      Right Ear: Tympanic membrane, ear canal and external ear normal.      Left Ear: Tympanic membrane, ear canal and external ear normal.      Nose: Nose normal.   Eyes:      General: Lids are normal.      Conjunctiva/sclera: Conjunctivae normal.      Pupils: Pupils are equal, round, and reactive to light.   Neck:      Thyroid: No thyromegaly.      Vascular: No carotid bruit or JVD.      Trachea: No tracheal deviation.   Cardiovascular:      Rate and Rhythm: Normal rate and regular rhythm.      Heart sounds: Normal heart sounds. No murmur heard.    No friction rub. No gallop.   Pulmonary:      Effort: Pulmonary effort is normal.      Breath sounds: Normal breath sounds. No stridor. No decreased breath sounds, wheezing or rales.   Abdominal:      General: Bowel sounds are normal. There is no distension.      Palpations: Abdomen is soft. There is no mass.      Tenderness: There is no abdominal tenderness. There is no guarding or rebound.      Hernia: No hernia is present.   Lymphadenopathy:      Head:      Right side of head: No submental, submandibular, tonsillar, preauricular, posterior auricular or occipital adenopathy.      Left side of head: No submental, submandibular, tonsillar, preauricular, posterior auricular or occipital adenopathy.      Cervical: No cervical adenopathy.   Skin:     General: Skin is warm and dry.      Coloration: Skin is not pale.   Neurological:      Mental Status: He is alert and oriented to person, place, and time.      Cranial Nerves: No cranial nerve deficit.      Sensory: No sensory deficit.      Coordination: Coordination normal.      Gait: Gait normal.      Deep Tendon Reflexes: Reflexes are normal and symmetric.         Data / Lab Results:    Hemoglobin A1C   Date Value Ref Range Status   02/16/2022 6.0 (H) 4.3 - 5.6 % Final     Comment:     (note)  A1C% Reference Range:  4.3 - 5.6  Normal range  5.7 - 6.4   Pre-diabetic -increased risk for developing diabetes mellitus.  >=6.5      Diabetic -diagnostic of diabetes mellitus.    Note: For diagnosis of diabetes in individuals without unequivocal   hyperglycemia, results should be confirmed by repeat testing.    Patients with conditions that shorten erythrocyte survival, such as   recovery from acute blood loss, hemolytic anemia, kidney disease,   or the presence of unstable hemogloblins like HbSS, HbCC, and HbSC   may yield falsely decreased HbA1c test results. Iron deficiency may   yield falsely increased HbA1c test results.   08/13/2019 5.9 % Final   10/22/2018 5.7 4.6 - 7.1 % Final   07/06/2018 5.8 4.6 - 7.1 % Final     Lab Results   Component Value Date     (H) 02/21/2020     Lab Results   Component Value Date     (H) 04/09/2022     (H) 10/16/2020     (H) 08/16/2019     Lab Results   Component Value Date    CHOL 184 03/19/2018    CHOL 204 (H) 02/22/2017     Lab Results   Component Value Date    TRIG 119 04/09/2022    TRIG 87 10/16/2020    TRIG 83 08/16/2019     Lab Results   Component Value Date    HDL 43 04/09/2022    HDL 40 10/16/2020    HDL 39 (L) 08/16/2019     Lab Results   Component Value Date    PSA 0.4 04/09/2022    PSA 0.3 10/16/2020    PSA 0.4 08/16/2019     Lab Results   Component Value Date    WBC 5.2 04/09/2022    HGB 15.4 04/09/2022    HCT 46.0 04/09/2022    MCV 96 04/09/2022     04/09/2022     Lab Results   Component Value Date    TSH 3.860 04/09/2022      Lab Results   Component Value Date    GLUCOSE 119 (H) 04/09/2022    BUN 18 04/09/2022    CREATININE 0.86 04/09/2022    EGFRIFNONA 85 10/16/2020    EGFRIFAFRI 98 10/16/2020    BCR 21 04/09/2022    K 4.3 04/09/2022    CO2 22 04/09/2022    CALCIUM 10.0 04/09/2022    PROTENTOTREF 7.4 04/09/2022    ALBUMIN 4.7 04/09/2022    LABIL2 1.7 04/09/2022    AST 24 04/09/2022    ALT 25 04/09/2022     No results found for: RADHA, RF, SEDRATE   No results found for: CRP   No results  found for: IRON, TIBC, FERRITIN   No results found for: [id number]     Age-appropriate Screening Schedule:  Refer to the list below for future screening recommendations based on patient's age, sex and/or medical conditions. Orders for these recommended tests are listed in the plan section. The patient has been provided with a written plan.    Health Maintenance   Topic Date Due   • DIABETIC EYE EXAM  07/15/2019   • DIABETIC FOOT EXAM  09/01/2020   • URINE MICROALBUMIN  03/16/2021   • HEMOGLOBIN A1C  08/16/2022   • INFLUENZA VACCINE  10/01/2022   • LIPID PANEL  04/09/2023   • TDAP/TD VACCINES (2 - Td or Tdap) 03/12/2029   • ZOSTER VACCINE  Discontinued           Assessment & Plan      Medications        Problem List         LOS      Physical.  Doing well, vaccines current.  Discussed coated baby aspirin daily.  Discussed health maintenance, screening test, lifestyle modification.  Lumbar disc disease.      Improving today status post fusion L5-S1 Dr Delgado on 2/15, advancing activity, has completed course PT.. .  Positive rIght lower extremity radiculopathy.  Status post lumbar disc surgery by to Dr. Mark.  Postop hemoglobin 13.5, normal on recheck.  Left knee pain.  Improved/resolved.  Flare OA versus medial meniscus tear.  Recommend joint injection, MRI, orthopedics referral he states he will consider.  Ice, NSAIDs, rehabilitation exercises discussed.  Call or return if persistent symptoms.  Osteoarthritis left hip.    Improved today status post Replacement per Dr. Elias, advancing activity.  Hypertension.  Improved, normotensive today, has lost weight.  Decrease lisinopril to 10 mg daily.  Monitor blood pressure at home.  Follow-up recheck.  Treadmill echo benign 2021.  Carotid stenosis.  Less than 50% obstruction bilaterally per carotid Dopplers 2021.  Continue risk factor reduction.  Elevated blood sugar..  Improved, diet controlled.  Follow-up recheck.  A1c 6.0 inpatient 2/22.  Hyperlipidemia.  Worse this  year, mild elevation, has done well red yeast rice, restart overall good control, increase to 2400 mg daily..  Discussed diet, exercise, lifestyle modification.  Follow-up recheck.  KRISTA.  Doing well on CPAP.  Prostate screening.  PSA benign.  Family history of colonoscopy.  Mom,  in her 50s.  Colonoscopy normal , repeat planned 5 years/due for repeat..  Has had recent fusion with hardware in place, gastroenterology referral scheduled.  Sleep disorder.  Refractory to melatonin start as needed trazodone.  Sleep hygiene discussed.      Diagnoses and all orders for this visit:    1. Encounter for annual general medical examination with abnormal findings in adult (Primary)    2. Hypertension, benign    3. Mixed hyperlipidemia    4. Class 1 obesity due to excess calories without serious comorbidity with body mass index (BMI) of 32.0 to 32.9 in adult  Assessment & Plan:  Patient's (Body mass index is 31.63 kg/m².) indicates that they are obese (BMI >30) with health conditions that include hypertension and dyslipidemias . Weight is unchanged. BMI is is above average; BMI management plan is completed. We discussed portion control and increasing exercise.       5. Screening PSA (prostate specific antigen)    6. Colon cancer screening    7. Elevated blood sugar    8. Insomnia, unspecified type  -     traZODone (DESYREL) 100 MG tablet; Take 0.5-1 tablets by mouth At Night As Needed for Sleep.  Dispense: 90 tablet; Refill: 3            Expected course, medications, and adverse effects discussed.  Call or return if worsening or persistent symptoms.  I wore protective equipment throughout this patient encounter including a mask, gloves, and eye protection.  Hand hygiene was performed before donning protective equipment and after removal when leaving the room. The complete contents of the Assessment and Plan and Data / Lab Results as documented above have been reviewed and addressed by myself with the patient today as part  of an ongoing evaluation / treatment plan.  If some of the documentation has been copied from a previous note and is unchanged it indicates that this problem / plan has been assessed today but is stable from a previous visit and no changes have been recommended.

## 2022-04-18 DIAGNOSIS — I10 HYPERTENSION, BENIGN: Chronic | ICD-10-CM

## 2022-04-18 RX ORDER — LISINOPRIL 20 MG/1
20 TABLET ORAL DAILY
Qty: 90 TABLET | Refills: 0 | Status: SHIPPED | OUTPATIENT
Start: 2022-04-18 | End: 2022-08-01

## 2022-04-25 DIAGNOSIS — Z80.0 FAMILY HISTORY OF COLON CANCER: Primary | ICD-10-CM

## 2022-04-25 DIAGNOSIS — Z12.11 COLON CANCER SCREENING: ICD-10-CM

## 2022-07-25 ENCOUNTER — OFFICE VISIT (OUTPATIENT)
Dept: FAMILY MEDICINE CLINIC | Facility: CLINIC | Age: 67
End: 2022-07-25

## 2022-07-25 ENCOUNTER — TELEPHONE (OUTPATIENT)
Dept: FAMILY MEDICINE CLINIC | Facility: CLINIC | Age: 67
End: 2022-07-25

## 2022-07-25 VITALS — WEIGHT: 200 LBS | HEIGHT: 68 IN | TEMPERATURE: 100.1 F | BODY MASS INDEX: 30.31 KG/M2

## 2022-07-25 DIAGNOSIS — R05.9 COUGH: ICD-10-CM

## 2022-07-25 DIAGNOSIS — E66.09 CLASS 1 OBESITY DUE TO EXCESS CALORIES WITHOUT SERIOUS COMORBIDITY WITH BODY MASS INDEX (BMI) OF 30.0 TO 30.9 IN ADULT: ICD-10-CM

## 2022-07-25 DIAGNOSIS — J02.9 SORETHROAT: ICD-10-CM

## 2022-07-25 DIAGNOSIS — U07.1 COVID-19 VIRUS DETECTED: Primary | ICD-10-CM

## 2022-07-25 PROCEDURE — 99443 PR PHYS/QHP TELEPHONE EVALUATION 21-30 MIN: CPT | Performed by: FAMILY MEDICINE

## 2022-07-25 RX ORDER — AZITHROMYCIN 250 MG/1
TABLET, FILM COATED ORAL
Qty: 6 TABLET | Refills: 0 | Status: SHIPPED | OUTPATIENT
Start: 2022-07-25 | End: 2022-08-18

## 2022-07-25 RX ORDER — PREDNISONE 1 MG/1
TABLET ORAL
Qty: 45 TABLET | Refills: 0 | Status: SHIPPED | OUTPATIENT
Start: 2022-07-25 | End: 2022-08-18

## 2022-07-25 NOTE — ASSESSMENT & PLAN NOTE
Patient's (Body mass index is 30.41 kg/m².) indicates that they are obese (BMI >30) with health conditions that include hypertension and dyslipidemias . Weight is unchanged. BMI is is above average; BMI management plan is completed. We discussed portion control and increasing exercise.

## 2022-07-25 NOTE — PROGRESS NOTES
Subjective   Rudy Henriquez Jr. is a 67 y.o. male.     Chief Complaint   Patient presents with   • Covid-19 Home Monitoring Phone Call       Ed reports symptoms started Saturday 7/23/2022. He was exposed to sick wife on Friday 7/22/2022. He tested negative on Friday and Saturday. Felt worse on Sunday 7/24/2022 and home test was positive.    Cough  This is a new problem. Episode onset: 7/23/2022. Associated symptoms include ear congestion, ear pain, a fever, headaches, myalgias, nasal congestion, postnasal drip and a sore throat. Pertinent negatives include no chest pain, chills or shortness of breath. Associated symptoms comments: Diarrhea, malaise and fatigue.            I personally reviewed and updated the patient's allergies, medications, problem list, and past medical, surgical, social, and family history. I have reviewed and confirmed the accuracy of the History of Present Illness and Review of Symptoms as documented by the MA/LPN/RN. Charles Calderon MD    Family History   Problem Relation Age of Onset   • Colon cancer Mother    • Lung cancer Father    • Diabetes Sister        Social History     Tobacco Use   • Smoking status: Never Smoker   • Smokeless tobacco: Never Used   Vaping Use   • Vaping Use: Never used   Substance Use Topics   • Alcohol use: No   • Drug use: No       Past Surgical History:   Procedure Laterality Date   • BACK SURGERY  06/07/2018    on L4 and L5 by dr landa   • BACK SURGERY  07/13/2017    L4-L5 Dr. Landa Widen spinal column   • HIP SURGERY Left 03/04/2020   • ORIF CALCANEAL FRACTURE Left 05/2018    [facility]   • SPINE SURGERY N/A 02/15/2022    Dr Delgado @ [facility] spine   • TONSILLECTOMY AND ADENOIDECTOMY      at age 48       Patient Active Problem List   Diagnosis   • Acute reaction to stress   • Asthma   • Obstructive sleep apnea   • Acute left-sided low back pain with right-sided sciatica   • Benign neoplasm of colon   • Carpal tunnel syndrome   • Encounter for annual general  medical examination with abnormal findings in adult   • Hyperlipidemia   • Hypertension, benign   • Impotence of organic origin   • Lumbar disc disease   • Sleep apnea   • Tremor   • Memory loss   • Class 1 obesity due to excess calories without serious comorbidity with body mass index (BMI) of 30.0 to 30.9 in adult   • Osteoarthritis of left hip   • Atopic dermatitis   • Screening PSA (prostate specific antigen)   • Colon cancer screening   • Chronic radicular lumbar pain   • Primary osteoarthritis of left hip   • Elevated blood sugar   • Lumbar post-laminectomy syndrome   • Degeneration of lumbar intervertebral disc   • Degenerative spondylolisthesis         Current Outpatient Medications:   •  aspirin 81 MG chewable tablet, Chew 81 mg Daily., Disp: , Rfl:   •  azelastine (ASTELIN) 0.1 % nasal spray, , Disp: , Rfl:   •  cholecalciferol (VITAMIN D3) 10 MCG (400 UNIT) tablet, Take 400 Units by mouth Daily., Disp: , Rfl:   •  lisinopril (PRINIVIL,ZESTRIL) 20 MG tablet, TAKE 1 TABLET BY MOUTH DAILY. PATIENT WILL NEED APPOINTMENT FOR FURTHER REFILLS, Disp: 90 tablet, Rfl: 0  •  melatonin 5 MG tablet tablet, Take 5 mg by mouth., Disp: , Rfl:   •  meloxicam (MOBIC) 15 MG tablet, TAKE 1 TABLET BY MOUTH DAILY. PATIENT WILL NEED APPOINTMENT FOR FURTHER REFILLS, Disp: 90 tablet, Rfl: 1  •  Multiple Vitamins-Minerals (MULTIVITAMIN ADULT PO), Take 1 tablet by mouth Daily., Disp: , Rfl:   •  Nystatin powder, Apply 1 application topically to the appropriate area as directed 2 (Two) Times a Day As Needed., Disp: , Rfl:   •  Red Yeast Rice 600 MG tablet, Take  by mouth., Disp: , Rfl:   •  traZODone (DESYREL) 100 MG tablet, Take 0.5-1 tablets by mouth At Night As Needed for Sleep., Disp: 90 tablet, Rfl: 3  •  Triamcinolone Acetonide (NASACORT) 55 MCG/ACT nasal inhaler, 2 sprays Daily., Disp: , Rfl:   •  azithromycin (ZITHROMAX) 250 MG tablet, Take 2 tablets the first day, then 1 tablet daily for 4 days., Disp: 6 tablet, Rfl: 0  •   "Molnupiravir (LAGEVRIO) 200 MG capsule, Take 4 capsules by mouth Every 12 (Twelve) Hours for 5 days., Disp: 40 capsule, Rfl: 0  •  predniSONE (DELTASONE) 5 MG tablet, 40mg x 3 days, 20mg x 3 days, 10mg x 3 days, 5mg x 3 days, Disp: 45 tablet, Rfl: 0         Review of Systems   Constitutional: Positive for fever. Negative for chills and diaphoresis.   HENT: Positive for ear pain, postnasal drip and sore throat.    Eyes: Negative for visual disturbance.   Respiratory: Positive for cough. Negative for shortness of breath.    Cardiovascular: Negative for chest pain and palpitations.   Gastrointestinal: Negative for abdominal pain and nausea.   Endocrine: Negative for polydipsia and polyphagia.   Musculoskeletal: Positive for myalgias. Negative for neck stiffness.   Skin: Negative for color change and pallor.   Neurological: Negative for seizures and syncope.   Hematological: Negative for adenopathy.       I have reviewed and confirmed the accuracy of the ROS as documented by the MA/LPN/RN Charles Calderon MD      Objective   Temp 100.1 °F (37.8 °C)   Ht 172.7 cm (68\")   Wt 90.7 kg (200 lb) Comment: pt reported  BMI 30.41 kg/m²   BP Readings from Last 3 Encounters:   04/13/22 122/64   03/14/22 118/62   02/20/22 123/73     Wt Readings from Last 3 Encounters:   07/25/22 90.7 kg (200 lb)   04/13/22 94.3 kg (208 lb)   03/14/22 92.5 kg (204 lb)     Physical Exam    Data / Lab Results:    Hemoglobin A1C   Date Value Ref Range Status   02/16/2022 6.0 (H) 4.3 - 5.6 % Final     Comment:     (note)  A1C% Reference Range:  4.3 - 5.6  Normal range  5.7 - 6.4  Pre-diabetic -increased risk for developing diabetes mellitus.  >=6.5      Diabetic -diagnostic of diabetes mellitus.    Note: For diagnosis of diabetes in individuals without unequivocal   hyperglycemia, results should be confirmed by repeat testing.    Patients with conditions that shorten erythrocyte survival, such as   recovery from acute blood loss, hemolytic anemia, " kidney disease,   or the presence of unstable hemogloblins like HbSS, HbCC, and HbSC   may yield falsely decreased HbA1c test results. Iron deficiency may   yield falsely increased HbA1c test results.   08/13/2019 5.9 % Final   10/22/2018 5.7 4.6 - 7.1 % Final   07/06/2018 5.8 4.6 - 7.1 % Final     Lab Results   Component Value Date     (H) 02/21/2020     Lab Results   Component Value Date     (H) 04/09/2022     (H) 10/16/2020     (H) 08/16/2019     Lab Results   Component Value Date    CHOL 184 03/19/2018    CHOL 204 (H) 02/22/2017     Lab Results   Component Value Date    TRIG 119 04/09/2022    TRIG 87 10/16/2020    TRIG 83 08/16/2019     Lab Results   Component Value Date    HDL 43 04/09/2022    HDL 40 10/16/2020    HDL 39 (L) 08/16/2019     Lab Results   Component Value Date    PSA 0.4 04/09/2022    PSA 0.3 10/16/2020    PSA 0.4 08/16/2019     Lab Results   Component Value Date    WBC 5.2 04/09/2022    HGB 15.4 04/09/2022    HCT 46.0 04/09/2022    MCV 96 04/09/2022     04/09/2022     Lab Results   Component Value Date    TSH 3.860 04/09/2022      Lab Results   Component Value Date    GLUCOSE 119 (H) 04/09/2022    BUN 18 04/09/2022    CREATININE 0.86 04/09/2022    EGFRIFNONA 85 10/16/2020    EGFRIFAFRI 98 10/16/2020    BCR 21 04/09/2022    K 4.3 04/09/2022    CO2 22 04/09/2022    CALCIUM 10.0 04/09/2022    PROTENTOTREF 7.4 04/09/2022    ALBUMIN 4.7 04/09/2022    LABIL2 1.7 04/09/2022    AST 24 04/09/2022    ALT 25 04/09/2022     No results found for: RADHA, RF, SEDRATE   No results found for: CRP   No results found for: IRON, TIBC, FERRITIN   No results found for: APFLIKUS84     Edward J Konick Jr. consented to undergo a telephone visit today.  This format was necessitated by the current covid-19 virus pandemic.  23 minutes was spent in phone today with her discussing his acute concerns of chronic medical problems.    Assessment & Plan      Medications        Problem List          LOS    COVID-19.  Start antibiotics/antiviral/prednisone.  Continue quarantine.  Signs symptoms of progression to severe disease discussed.  Has been vaccinated.  Call return if worsening symptoms.  Health maintenance.  Doing well, vaccines current.  Discussed coated baby aspirin daily.  Discussed health maintenance, screening test, lifestyle modification.  Lumbar disc disease.      Improving today status post fusion L5-S1 Dr Delgado on 2/15, advancing activity, has completed course PT.. .  Positive rIght lower extremity radiculopathy.  Status post lumbar disc surgery by to Dr. Mark.  Postop hemoglobin 13.5, normal on recheck.  Left knee pain.  Improved/resolved.  Flare OA versus medial meniscus tear.  Recommend joint injection, MRI, orthopedics referral he states he will consider.  Ice, NSAIDs, rehabilitation exercises discussed.  Call or return if persistent symptoms.  Osteoarthritis left hip.    Improved today status post Replacement per Dr. Elias, advancing activity.  Hypertension.  Improved, normotensive today, has lost weight.  Decrease lisinopril to 10 mg daily.  Monitor blood pressure at home.  Follow-up recheck.  Treadmill echo benign .  Carotid stenosis.  Less than 50% obstruction bilaterally per carotid Dopplers .  Continue risk factor reduction.  Elevated blood sugar..  Improved, diet controlled.  Follow-up recheck.  A1c 6.0 inpatient .  Hyperlipidemia.  Worse this year, mild elevation, has done well red yeast rice, restart overall good control, increase to 2400 mg daily..  Discussed diet, exercise, lifestyle modification.  Follow-up recheck.  KRISTA.  Doing well on CPAP.  Prostate screening.  PSA benign.  Family history of colonoscopy.  Mom,  in her 50s.  Colonoscopy normal , repeat planned 5 years/due for repeat..  Has had recent fusion with hardware in place, gastroenterology referral scheduled.  Sleep disorder.  Refractory to melatonin start as needed trazodone.  Sleep hygiene  discussed.        Problem List Items Addressed This Visit        Unprioritized    Class 1 obesity due to excess calories without serious comorbidity with body mass index (BMI) of 30.0 to 30.9 in adult    Current Assessment & Plan     Patient's (Body mass index is 30.41 kg/m².) indicates that they are obese (BMI >30) with health conditions that include hypertension and dyslipidemias . Weight is unchanged. BMI is is above average; BMI management plan is completed. We discussed portion control and increasing exercise.              Other Visit Diagnoses     COVID-19 virus detected    -  Primary    Relevant Medications    Molnupiravir (LAGEVRIO) 200 MG capsule    Cough        Relevant Medications    azithromycin (ZITHROMAX) 250 MG tablet    predniSONE (DELTASONE) 5 MG tablet    Sorethroat        Relevant Medications    azithromycin (ZITHROMAX) 250 MG tablet    predniSONE (DELTASONE) 5 MG tablet              Expected course, medications, and adverse effects discussed.  Call or return if worsening or persistent symptoms.  The complete contents of the Assessment and Plan and Data / Lab Results as documented above have been reviewed and addressed by myself with the patient today as part of an ongoing evaluation / treatment plan.  If some of the documentation has been copied from a previous note and is unchanged it indicates that this problem / plan has been assessed today but is stable from a previous visit and no changes have been recommended.

## 2022-07-27 ENCOUNTER — OFFICE VISIT (OUTPATIENT)
Dept: FAMILY MEDICINE CLINIC | Facility: CLINIC | Age: 67
End: 2022-07-27

## 2022-07-27 ENCOUNTER — HOSPITAL ENCOUNTER (OUTPATIENT)
Dept: GENERAL RADIOLOGY | Facility: HOSPITAL | Age: 67
Discharge: HOME | End: 2022-07-27
Admitting: FAMILY MEDICINE

## 2022-07-27 VITALS
HEIGHT: 68 IN | SYSTOLIC BLOOD PRESSURE: 124 MMHG | TEMPERATURE: 97.9 F | DIASTOLIC BLOOD PRESSURE: 62 MMHG | RESPIRATION RATE: 18 BRPM | OXYGEN SATURATION: 98 % | BODY MASS INDEX: 31.67 KG/M2 | HEART RATE: 77 BPM | WEIGHT: 209 LBS

## 2022-07-27 DIAGNOSIS — E66.09 CLASS 1 OBESITY DUE TO EXCESS CALORIES WITHOUT SERIOUS COMORBIDITY WITH BODY MASS INDEX (BMI) OF 30.0 TO 30.9 IN ADULT: ICD-10-CM

## 2022-07-27 DIAGNOSIS — U07.1 COVID-19: Primary | ICD-10-CM

## 2022-07-27 DIAGNOSIS — M51.9 LUMBAR DISC DISEASE: ICD-10-CM

## 2022-07-27 DIAGNOSIS — J06.9 UPPER RESPIRATORY TRACT INFECTION, UNSPECIFIED TYPE: ICD-10-CM

## 2022-07-27 PROCEDURE — 99213 OFFICE O/P EST LOW 20 MIN: CPT | Performed by: FAMILY MEDICINE

## 2022-07-27 PROCEDURE — 96372 THER/PROPH/DIAG INJ SC/IM: CPT | Performed by: FAMILY MEDICINE

## 2022-07-27 PROCEDURE — 71046 X-RAY EXAM CHEST 2 VIEWS: CPT

## 2022-07-27 RX ORDER — DEXAMETHASONE SODIUM PHOSPHATE 10 MG/ML
10 INJECTION INTRAMUSCULAR; INTRAVENOUS ONCE
Status: COMPLETED | OUTPATIENT
Start: 2022-07-27 | End: 2022-07-27

## 2022-07-27 RX ORDER — ALBUTEROL SULFATE 90 UG/1
2 AEROSOL, METERED RESPIRATORY (INHALATION) EVERY 4 HOURS PRN
Qty: 18 G | Refills: 0 | Status: SHIPPED | OUTPATIENT
Start: 2022-07-27 | End: 2022-07-28 | Stop reason: CLARIF

## 2022-07-27 RX ORDER — MELOXICAM 15 MG/1
15 TABLET ORAL DAILY
Qty: 90 TABLET | Refills: 1 | Status: SHIPPED | OUTPATIENT
Start: 2022-07-27 | End: 2023-02-07

## 2022-07-27 RX ADMIN — DEXAMETHASONE SODIUM PHOSPHATE 10 MG: 10 INJECTION INTRAMUSCULAR; INTRAVENOUS at 12:32

## 2022-07-28 ENCOUNTER — TELEPHONE (OUTPATIENT)
Dept: FAMILY MEDICINE CLINIC | Facility: CLINIC | Age: 67
End: 2022-07-28

## 2022-07-28 RX ORDER — LEVALBUTEROL TARTRATE 45 UG/1
1-2 AEROSOL, METERED ORAL EVERY 4 HOURS PRN
Qty: 15 G | Refills: 0 | Status: SHIPPED | OUTPATIENT
Start: 2022-07-28

## 2022-07-28 RX ORDER — LEVALBUTEROL TARTRATE 45 UG/1
1-2 AEROSOL, METERED ORAL EVERY 4 HOURS PRN
Status: CANCELLED | OUTPATIENT
Start: 2022-07-28

## 2022-07-28 NOTE — TELEPHONE ENCOUNTER
Caller: Rudy Henriquez Jr.    Relationship to patient: Self    Best call back number: [phone]    Patient is needing: PATIENT WAS PRESCRIBED AN INHALER YESTERDAY BUT PATIENTS INSURANCE DOES NOT COVER THAT EXACT ONE.    PATIENT IS REQUESTING A NEW SCRIPT FOR A GENERIC BRAND IF POSSIBLE.    PLEASE CONTACT PATIENT IF NEEDED

## 2022-07-30 DIAGNOSIS — I10 HYPERTENSION, BENIGN: Chronic | ICD-10-CM

## 2022-08-01 ENCOUNTER — TELEPHONE (OUTPATIENT)
Dept: FAMILY MEDICINE CLINIC | Facility: CLINIC | Age: 67
End: 2022-08-01

## 2022-08-01 RX ORDER — LISINOPRIL 20 MG/1
20 TABLET ORAL DAILY
Qty: 90 TABLET | Refills: 0 | Status: SHIPPED | OUTPATIENT
Start: 2022-08-01 | End: 2022-11-01

## 2022-08-01 NOTE — TELEPHONE ENCOUNTER
Ed reports he is feeling better. He reports some fatigue but returned to work today. Advised of test results and expressed understanding. He will call back for re evaluation if fatigue does not improve

## 2022-08-01 NOTE — TELEPHONE ENCOUNTER
----- Message from Charles Calderon MD sent at 7/29/2022  2:33 PM EDT -----  Check on add, make sure he is improving, let him know his chest x-ray looks good, no sign of pneumonia, thanks

## 2022-08-18 ENCOUNTER — OFFICE VISIT (OUTPATIENT)
Dept: FAMILY MEDICINE CLINIC | Facility: CLINIC | Age: 67
End: 2022-08-18

## 2022-08-18 VITALS
HEART RATE: 85 BPM | TEMPERATURE: 97.7 F | DIASTOLIC BLOOD PRESSURE: 70 MMHG | OXYGEN SATURATION: 95 % | HEIGHT: 68 IN | BODY MASS INDEX: 32.07 KG/M2 | RESPIRATION RATE: 18 BRPM | SYSTOLIC BLOOD PRESSURE: 117 MMHG | WEIGHT: 211.6 LBS

## 2022-08-18 DIAGNOSIS — U07.1 COVID-19 VIRUS INFECTION: ICD-10-CM

## 2022-08-18 DIAGNOSIS — E66.09 CLASS 1 OBESITY DUE TO EXCESS CALORIES WITHOUT SERIOUS COMORBIDITY WITH BODY MASS INDEX (BMI) OF 30.0 TO 30.9 IN ADULT: ICD-10-CM

## 2022-08-18 DIAGNOSIS — R06.02 SHORTNESS OF BREATH: Primary | ICD-10-CM

## 2022-08-18 DIAGNOSIS — R07.81 PLEURITIC CHEST PAIN: ICD-10-CM

## 2022-08-18 PROCEDURE — 99213 OFFICE O/P EST LOW 20 MIN: CPT | Performed by: FAMILY MEDICINE

## 2022-08-18 RX ORDER — PREDNISONE 1 MG/1
TABLET ORAL
Qty: 45 TABLET | Refills: 0 | Status: SHIPPED | OUTPATIENT
Start: 2022-08-18

## 2022-08-18 RX ORDER — FLUTICASONE PROPIONATE 220 UG/1
1 AEROSOL, METERED RESPIRATORY (INHALATION)
Qty: 12 G | Refills: 2 | Status: SHIPPED | OUTPATIENT
Start: 2022-08-18

## 2022-08-18 NOTE — PROGRESS NOTES
Subjective   Rudy Henriquez Jr. is a 67 y.o. male.     Chief Complaint   Patient presents with   • Shortness of Breath       Shortness of Breath  This is a new problem. The current episode started 1 to 4 weeks ago. The problem has been unchanged. Associated symptoms include headaches, PND and rhinorrhea. Pertinent negatives include no abdominal pain, chest pain or fever. Exacerbated by: Deep Breaths  Associated symptoms comments: Tightness in chest with deep breath, fatigued. The patient has no known risk factors for DVT/PE. Treatments tried: Xopenex. There is no history of asthma, chronic lung disease, COPD, PE or pneumonia.            I personally reviewed and updated the patient's allergies, medications, problem list, and past medical, surgical, social, and family history. I have reviewed and confirmed the accuracy of the History of Present Illness and Review of Symptoms as documented by the MA/LPN/RN. Charles Calderno MD    Family History   Problem Relation Age of Onset   • Colon cancer Mother    • Lung cancer Father    • Diabetes Sister        Social History     Tobacco Use   • Smoking status: Never Smoker   • Smokeless tobacco: Never Used   Vaping Use   • Vaping Use: Never used   Substance Use Topics   • Alcohol use: No   • Drug use: No       Past Surgical History:   Procedure Laterality Date   • BACK SURGERY  06/07/2018    on L4 and L5 by dr landa   • BACK SURGERY  07/13/2017    L4-L5 Dr. Landa Widen spinal column   • HIP SURGERY Left 03/04/2020   • ORIF CALCANEAL FRACTURE Left 05/2018    [facility]   • SPINE SURGERY N/A 02/15/2022    Dr Delgado @ [facility] spine   • TONSILLECTOMY AND ADENOIDECTOMY      at age 48       Patient Active Problem List   Diagnosis   • Acute reaction to stress   • Asthma   • Obstructive sleep apnea   • Acute left-sided low back pain with right-sided sciatica   • Benign neoplasm of colon   • Carpal tunnel syndrome   • Encounter for annual general medical examination with abnormal  findings in adult   • Hyperlipidemia   • Hypertension, benign   • Impotence of organic origin   • Lumbar disc disease   • Sleep apnea   • Tremor   • Memory loss   • Class 1 obesity due to excess calories without serious comorbidity with body mass index (BMI) of 30.0 to 30.9 in adult   • Osteoarthritis of left hip   • Atopic dermatitis   • Screening PSA (prostate specific antigen)   • Colon cancer screening   • Chronic radicular lumbar pain   • Primary osteoarthritis of left hip   • Elevated blood sugar   • Lumbar post-laminectomy syndrome   • Degeneration of lumbar intervertebral disc   • Degenerative spondylolisthesis   • Pleuritic chest pain   • COVID-19 virus infection         Current Outpatient Medications:   •  aspirin 81 MG chewable tablet, Chew 81 mg Daily., Disp: , Rfl:   •  azelastine (ASTELIN) 0.1 % nasal spray, , Disp: , Rfl:   •  cholecalciferol (VITAMIN D3) 10 MCG (400 UNIT) tablet, Take 400 Units by mouth Daily., Disp: , Rfl:   •  levalbuterol (XOPENEX HFA) 45 MCG/ACT inhaler, Inhale 1-2 puffs Every 4 (Four) Hours As Needed for Wheezing., Disp: 15 g, Rfl: 0  •  lisinopril (PRINIVIL,ZESTRIL) 20 MG tablet, TAKE 1 TABLET BY MOUTH DAILY. PATIENT WILL NEED APPOINTMENT FOR FURTHER REFILLS, Disp: 90 tablet, Rfl: 0  •  melatonin 5 MG tablet tablet, Take 5 mg by mouth., Disp: , Rfl:   •  meloxicam (MOBIC) 15 MG tablet, TAKE 1 TABLET BY MOUTH DAILY. PATIENT WILL NEED APPOINTMENT FOR FURTHER REFILLS, Disp: 90 tablet, Rfl: 1  •  Multiple Vitamins-Minerals (MULTIVITAMIN ADULT PO), Take 1 tablet by mouth Daily., Disp: , Rfl:   •  Nystatin powder, Apply 1 application topically to the appropriate area as directed 2 (Two) Times a Day As Needed., Disp: , Rfl:   •  Red Yeast Rice 600 MG tablet, Take  by mouth., Disp: , Rfl:   •  traZODone (DESYREL) 100 MG tablet, Take 0.5-1 tablets by mouth At Night As Needed for Sleep., Disp: 90 tablet, Rfl: 3  •  Triamcinolone Acetonide (NASACORT) 55 MCG/ACT nasal inhaler, 2 sprays  "Daily., Disp: , Rfl:   •  fluticasone (Flovent HFA) 220 MCG/ACT inhaler, Inhale 1 puff 2 (Two) Times a Day., Disp: 12 g, Rfl: 2  •  predniSONE (DELTASONE) 5 MG tablet, 40mg x 3 days, 20mg x 3 days, 10mg x 3 days, 5mg x 3 days, Disp: 45 tablet, Rfl: 0         Review of Systems   Constitutional: Negative for chills, diaphoresis and fever.   HENT: Positive for rhinorrhea.    Eyes: Negative for visual disturbance.   Respiratory: Positive for shortness of breath.    Cardiovascular: Positive for PND. Negative for chest pain and palpitations.   Gastrointestinal: Negative for abdominal pain and nausea.   Endocrine: Negative for polydipsia and polyphagia.   Musculoskeletal: Negative for neck stiffness.   Skin: Negative for color change and pallor.   Neurological: Negative for seizures and syncope.   Hematological: Negative for adenopathy.   Psychiatric/Behavioral: Negative for hallucinations and suicidal ideas.       I have reviewed and confirmed the accuracy of the ROS as documented by the MA/LPN/RN Charles Calderon MD      Objective   /70   Pulse 85   Temp 97.7 °F (36.5 °C)   Resp 18   Ht 172.7 cm (68\")   Wt 96 kg (211 lb 9.6 oz)   SpO2 95%   BMI 32.17 kg/m²   BP Readings from Last 3 Encounters:   08/18/22 117/70   07/27/22 124/62   04/13/22 122/64     Wt Readings from Last 3 Encounters:   08/18/22 96 kg (211 lb 9.6 oz)   07/27/22 94.8 kg (209 lb)   07/25/22 90.7 kg (200 lb)     Physical Exam  Constitutional:       Appearance: Normal appearance. He is well-developed. He is not diaphoretic.   HENT:      Head: Normocephalic.      Right Ear: Hearing, ear canal and external ear normal. A middle ear effusion is present. Tympanic membrane is erythematous.      Left Ear: Hearing, ear canal and external ear normal. A middle ear effusion is present. Tympanic membrane is erythematous.      Nose: Congestion present.      Right Sinus: Maxillary sinus tenderness and frontal sinus tenderness present.      Left Sinus: " Maxillary sinus tenderness and frontal sinus tenderness present.      Mouth/Throat:      Pharynx: Posterior oropharyngeal erythema present.      Tonsils: No tonsillar abscesses. 1+ on the right. 1+ on the left.   Eyes:      General: Lids are normal.      Conjunctiva/sclera: Conjunctivae normal.      Pupils: Pupils are equal, round, and reactive to light.   Neck:      Meningeal: Brudzinski's sign and Kernig's sign absent.   Cardiovascular:      Rate and Rhythm: Normal rate and regular rhythm.      Pulses: Normal pulses.      Heart sounds: Normal heart sounds, S1 normal and S2 normal. No murmur heard.    No friction rub. No gallop.   Pulmonary:      Effort: Pulmonary effort is normal. No accessory muscle usage or respiratory distress.      Breath sounds: No stridor. Examination of the right-upper field reveals wheezing and rhonchi. Examination of the left-upper field reveals wheezing and rhonchi. Examination of the right-middle field reveals wheezing and rhonchi. Examination of the left-middle field reveals wheezing and rhonchi. Examination of the right-lower field reveals wheezing and rhonchi. Examination of the left-lower field reveals wheezing and rhonchi. Wheezing and rhonchi present. No decreased breath sounds or rales.   Abdominal:      General: Bowel sounds are normal. There is no distension.      Palpations: Abdomen is soft. There is no mass.      Tenderness: There is no abdominal tenderness.      Hernia: No hernia is present.   Skin:     General: Skin is warm and dry.      Coloration: Skin is not pale.   Neurological:      Mental Status: He is alert and oriented to person, place, and time.      Cranial Nerves: No cranial nerve deficit.      Coordination: Coordination normal.      Gait: Gait normal.         Data / Lab Results:    Hemoglobin A1C   Date Value Ref Range Status   02/16/2022 6.0 (H) 4.3 - 5.6 % Final     Comment:     (note)  A1C% Reference Range:  4.3 - 5.6  Normal range  5.7 - 6.4  Pre-diabetic  -increased risk for developing diabetes mellitus.  >=6.5      Diabetic -diagnostic of diabetes mellitus.    Note: For diagnosis of diabetes in individuals without unequivocal   hyperglycemia, results should be confirmed by repeat testing.    Patients with conditions that shorten erythrocyte survival, such as   recovery from acute blood loss, hemolytic anemia, kidney disease,   or the presence of unstable hemogloblins like HbSS, HbCC, and HbSC   may yield falsely decreased HbA1c test results. Iron deficiency may   yield falsely increased HbA1c test results.   08/13/2019 5.9 % Final   10/22/2018 5.7 4.6 - 7.1 % Final   07/06/2018 5.8 4.6 - 7.1 % Final     Lab Results   Component Value Date     (H) 02/21/2020     Lab Results   Component Value Date     (H) 04/09/2022     (H) 10/16/2020     (H) 08/16/2019     Lab Results   Component Value Date    CHOL 184 03/19/2018    CHOL 204 (H) 02/22/2017     Lab Results   Component Value Date    TRIG 119 04/09/2022    TRIG 87 10/16/2020    TRIG 83 08/16/2019     Lab Results   Component Value Date    HDL 43 04/09/2022    HDL 40 10/16/2020    HDL 39 (L) 08/16/2019     Lab Results   Component Value Date    PSA 0.4 04/09/2022    PSA 0.3 10/16/2020    PSA 0.4 08/16/2019     Lab Results   Component Value Date    WBC 5.2 04/09/2022    HGB 15.4 04/09/2022    HCT 46.0 04/09/2022    MCV 96 04/09/2022     04/09/2022     Lab Results   Component Value Date    TSH 3.860 04/09/2022      Lab Results   Component Value Date    GLUCOSE 119 (H) 04/09/2022    BUN 18 04/09/2022    CREATININE 0.86 04/09/2022    EGFRIFNONA 85 10/16/2020    EGFRIFAFRI 98 10/16/2020    BCR 21 04/09/2022    K 4.3 04/09/2022    CO2 22 04/09/2022    CALCIUM 10.0 04/09/2022    PROTENTOTREF 7.4 04/09/2022    ALBUMIN 4.7 04/09/2022    LABIL2 1.7 04/09/2022    AST 24 04/09/2022    ALT 25 04/09/2022     No results found for: RADHA, RF, SEDRATE   No results found for: CRP   No results found for: IRON,  TIBC, FERRITIN   No results found for: [id number]       Assessment & Plan      Medications        Problem List         LOS      COVID-19.      Much improved today, having some recurrent symptoms today with bronchospasm/some pleuritic chest pain, overall benign exam, restart prednisone.  Symptoms symptoms initially resolved with steroid Rx. Start as needed albuterol inhaler.    Has completed course antibiotics/antiviral/prednisone.  Continue quarantine.  Signs symptoms of progression to severe disease discussed.  Has been vaccinated.  Call return if worsening symptoms.  Health maintenance.  Doing well, vaccines current.  Discussed coated baby aspirin daily.  Discussed health maintenance, screening test, lifestyle modification.  Lumbar disc disease.      Improving today status post fusion L5-S1 Dr Delgado on 2/15, advancing activity, has completed course PT.. .  Positive rIght lower extremity radiculopathy.  Status post lumbar disc surgery by to Dr. Mark.  Postop hemoglobin 13.5, normal on recheck.  Left knee pain.  Improved/resolved.  Flare OA versus medial meniscus tear.  Recommend joint injection, MRI, orthopedics referral he states he will consider.  Ice, NSAIDs, rehabilitation exercises discussed.  Call or return if persistent symptoms.  Osteoarthritis left hip.    Improved today status post Replacement per Dr. Elias, advancing activity.  Hypertension.  Improved, normotensive today, has lost weight.  Decrease lisinopril to 10 mg daily.  Monitor blood pressure at home.  Follow-up recheck.  Treadmill echo benign 2021.  Carotid stenosis.  Less than 50% obstruction bilaterally per carotid Dopplers 2021.  Continue risk factor reduction.  Elevated blood sugar..  Improved, diet controlled.  Follow-up recheck.  A1c 6.0 inpatient 2/22.  Hyperlipidemia.  Worse this year, mild elevation, has done well red yeast rice, restart overall good control, increase to 2400 mg daily..  Discussed diet, exercise, lifestyle  modification.  Follow-up recheck.  KRISTA.  Doing well on CPAP.  Prostate screening.  PSA benign.  Family history of colon cancer.  Mom,  in her 50s.  Colonoscopy normal , repeat planned 5 years/due for repeat..  Has had recent fusion with hardware in place, gastroenterology referral scheduled.  Sleep disorder.  Refractory to melatonin start as needed trazodone.  Sleep hygiene discussed.           Diagnoses and all orders for this visit:    1. Shortness of breath (Primary)  -     predniSONE (DELTASONE) 5 MG tablet; 40mg x 3 days, 20mg x 3 days, 10mg x 3 days, 5mg x 3 days  Dispense: 45 tablet; Refill: 0  -     fluticasone (Flovent HFA) 220 MCG/ACT inhaler; Inhale 1 puff 2 (Two) Times a Day.  Dispense: 12 g; Refill: 2    2. Class 1 obesity due to excess calories without serious comorbidity with body mass index (BMI) of 30.0 to 30.9 in adult  Assessment & Plan:  BMI is >= 30 and <35. (Class 1 Obesity). The following options were offered after discussion;: exercise counseling/recommendations and nutrition counseling/recommendations        3. Pleuritic chest pain    4. COVID-19 virus infection            Expected course, medications, and adverse effects discussed.  Call or return if worsening or persistent symptoms.  I wore protective equipment throughout this patient encounter including a mask, gloves, and eye protection.  Hand hygiene was performed before donning protective equipment and after removal when leaving the room. The complete contents of the Assessment and Plan and Data/Lab Results as documented above have been reviewed and addressed by myself with the patient today as part of an ongoing evaluation / treatment plan.  If some of the documentation has been copied from a previous note and is unchanged it indicates that this problem / plan has been assessed today but is stable from a previous visit and no changes have been recommended.

## 2022-11-01 DIAGNOSIS — I10 HYPERTENSION, BENIGN: Chronic | ICD-10-CM

## 2022-11-01 RX ORDER — LISINOPRIL 20 MG/1
20 TABLET ORAL DAILY
Qty: 90 TABLET | Refills: 0 | Status: SHIPPED | OUTPATIENT
Start: 2022-11-01 | End: 2023-02-07

## 2023-01-25 ENCOUNTER — TELEPHONE (OUTPATIENT)
Dept: FAMILY MEDICINE CLINIC | Facility: CLINIC | Age: 68
End: 2023-01-25

## 2023-01-25 NOTE — TELEPHONE ENCOUNTER
PATIENT CALLED TO BE SQUEEZED IN FOR A WELCOME TO MEDICARE APPOINTMENT. NEW MEDICARE CARD. PER DR. PEREZ   HE WOULD LIKE TO SWITCH HIS APPOINTMENT TO 2/13/23 IN THE MORNING OR AFTER 3:00 FROM 4/13/23 AT 9:00 LABS WILL ALSO NEED TO BE CHANGED    PLEASE CALL [phone]    NO APPOINTMENTS AVAILABLE

## 2023-01-26 NOTE — TELEPHONE ENCOUNTER
Called and no answer.  is not in the office that week. This can not be done. He will need to reschedule for a different time than that week if necessary.

## 2023-02-07 DIAGNOSIS — M51.9 LUMBAR DISC DISEASE: ICD-10-CM

## 2023-02-07 DIAGNOSIS — I10 HYPERTENSION, BENIGN: Chronic | ICD-10-CM

## 2023-02-07 RX ORDER — MELOXICAM 15 MG/1
15 TABLET ORAL DAILY
Qty: 90 TABLET | Refills: 1 | Status: SHIPPED | OUTPATIENT
Start: 2023-02-07

## 2023-02-07 RX ORDER — LISINOPRIL 20 MG/1
20 TABLET ORAL DAILY
Qty: 90 TABLET | Refills: 0 | Status: SHIPPED | OUTPATIENT
Start: 2023-02-07

## 2023-04-12 LAB
ALBUMIN SERPL-MCNC: 4.7 G/DL (ref 3.8–4.8)
ALBUMIN/GLOB SERPL: 2 {RATIO} (ref 1.2–2.2)
ALP SERPL-CCNC: 65 IU/L (ref 44–121)
ALT SERPL-CCNC: 38 IU/L (ref 0–44)
AST SERPL-CCNC: 26 IU/L (ref 0–40)
BASOPHILS # BLD AUTO: 0.1 X10E3/UL (ref 0–0.2)
BASOPHILS NFR BLD AUTO: 1 %
BILIRUB SERPL-MCNC: 0.6 MG/DL (ref 0–1.2)
BUN SERPL-MCNC: 22 MG/DL (ref 8–27)
BUN/CREAT SERPL: 25 (ref 10–24)
CALCIUM SERPL-MCNC: 9.5 MG/DL (ref 8.6–10.2)
CHLORIDE SERPL-SCNC: 102 MMOL/L (ref 96–106)
CHOLEST SERPL-MCNC: 189 MG/DL (ref 100–199)
CHOLEST/HDLC SERPL: 4.2 RATIO (ref 0–5)
CO2 SERPL-SCNC: 23 MMOL/L (ref 20–29)
CREAT SERPL-MCNC: 0.87 MG/DL (ref 0.76–1.27)
EGFRCR SERPLBLD CKD-EPI 2021: 96 ML/MIN/1.73
EOSINOPHIL # BLD AUTO: 0.2 X10E3/UL (ref 0–0.4)
EOSINOPHIL NFR BLD AUTO: 4 %
ERYTHROCYTE [DISTWIDTH] IN BLOOD BY AUTOMATED COUNT: 12.8 % (ref 11.6–15.4)
GLOBULIN SER CALC-MCNC: 2.4 G/DL (ref 1.5–4.5)
GLUCOSE SERPL-MCNC: 116 MG/DL (ref 70–99)
HCT VFR BLD AUTO: 45.3 % (ref 37.5–51)
HDLC SERPL-MCNC: 45 MG/DL
HGB BLD-MCNC: 15.5 G/DL (ref 13–17.7)
IMM GRANULOCYTES # BLD AUTO: 0 X10E3/UL (ref 0–0.1)
IMM GRANULOCYTES NFR BLD AUTO: 0 %
LDLC SERPL CALC-MCNC: 127 MG/DL (ref 0–99)
LYMPHOCYTES # BLD AUTO: 1.3 X10E3/UL (ref 0.7–3.1)
LYMPHOCYTES NFR BLD AUTO: 25 %
MCH RBC QN AUTO: 32.5 PG (ref 26.6–33)
MCHC RBC AUTO-ENTMCNC: 34.2 G/DL (ref 31.5–35.7)
MCV RBC AUTO: 95 FL (ref 79–97)
MONOCYTES # BLD AUTO: 0.4 X10E3/UL (ref 0.1–0.9)
MONOCYTES NFR BLD AUTO: 9 %
NEUTROPHILS # BLD AUTO: 3.1 X10E3/UL (ref 1.4–7)
NEUTROPHILS NFR BLD AUTO: 61 %
PLATELET # BLD AUTO: 226 X10E3/UL (ref 150–450)
POTASSIUM SERPL-SCNC: 4.4 MMOL/L (ref 3.5–5.2)
PROT SERPL-MCNC: 7.1 G/DL (ref 6–8.5)
PSA SERPL-MCNC: 0.6 NG/ML (ref 0–4)
RBC # BLD AUTO: 4.77 X10E6/UL (ref 4.14–5.8)
SODIUM SERPL-SCNC: 138 MMOL/L (ref 134–144)
TRIGL SERPL-MCNC: 92 MG/DL (ref 0–149)
TSH SERPL DL<=0.005 MIU/L-ACNC: 3.84 UIU/ML (ref 0.45–4.5)
VLDLC SERPL CALC-MCNC: 17 MG/DL (ref 5–40)
WBC # BLD AUTO: 5.1 X10E3/UL (ref 3.4–10.8)

## 2023-04-18 PROBLEM — Z00.00 WELCOME TO MEDICARE PREVENTIVE VISIT: Status: ACTIVE | Noted: 2019-08-12

## 2023-04-18 NOTE — PROGRESS NOTES
Subjective   Rudy Henriquez Jr. is a 67 y.o. male.     Chief Complaint   Patient presents with   • Welcome To Medicare   • Hypertension   • Hyperlipidemia       Hypertension  This is a chronic problem. The current episode started more than 1 year ago. The problem is unchanged. The problem is uncontrolled. Pertinent negatives include no chest pain, neck pain, palpitations or shortness of breath. Risk factors for coronary artery disease include obesity, male gender and diabetes mellitus. Current antihypertension treatment includes ACE inhibitors. The current treatment provides moderate improvement. There are no compliance problems.    Hyperlipidemia  This is a chronic problem. The current episode started more than 1 year ago. Recent lipid tests were reviewed and are high. Exacerbating diseases include diabetes and obesity. Pertinent negatives include no chest pain or shortness of breath. Current antihyperlipidemic treatment includes diet change and exercise. Risk factors for coronary artery disease include dyslipidemia, male sex, hypertension and obesity.            I personally reviewed and updated the patient's allergies, medications, problem list, and past medical, surgical, social, and family history. I have reviewed and confirmed the accuracy of the History of Present Illness and Review of Symptoms as documented by the MA/LPN/RN. Charles Calderon MD    Family History   Problem Relation Age of Onset   • Colon cancer Mother    • Lung cancer Father    • Diabetes Sister        Social History     Tobacco Use   • Smoking status: Never   • Smokeless tobacco: Never   Vaping Use   • Vaping Use: Never used   Substance Use Topics   • Alcohol use: No   • Drug use: No       Past Surgical History:   Procedure Laterality Date   • BACK SURGERY  06/07/2018    on L4 and L5 by dr mark   • BACK SURGERY  07/13/2017    L4-L5 Dr. Mark Widen spinal column   • HIP SURGERY Left 03/04/2020   • ORIF CALCANEAL FRACTURE Left 05/2018     [facility]   • SPINE SURGERY N/A 02/15/2022    Dr Delgado @ [facility]   • TONSILLECTOMY AND ADENOIDECTOMY      at age 48       Patient Active Problem List   Diagnosis   • Acute reaction to stress   • Asthma   • Obstructive sleep apnea   • Acute left-sided low back pain with right-sided sciatica   • Benign neoplasm of colon   • Carpal tunnel syndrome   • Welcome to Medicare preventive visit   • Hyperlipidemia   • Hypertension, benign   • Impotence of organic origin   • Lumbar disc disease   • Sleep apnea   • Tremor   • Memory loss   • Class 1 obesity due to excess calories without serious comorbidity with body mass index (BMI) of 31.0 to 31.9 in adult   • Osteoarthritis of left hip   • Atopic dermatitis   • Screening PSA (prostate specific antigen)   • Colon cancer screening   • Chronic radicular lumbar pain   • Primary osteoarthritis of left hip   • Elevated blood sugar   • Lumbar post-laminectomy syndrome   • Degeneration of lumbar intervertebral disc   • Degenerative spondylolisthesis   • Pleuritic chest pain   • COVID-19 virus infection         Current Outpatient Medications:   •  aspirin 81 MG chewable tablet, Chew 1 tablet Daily., Disp: , Rfl:   •  azelastine (ASTELIN) 0.1 % nasal spray, , Disp: , Rfl:   •  cholecalciferol (VITAMIN D3) 10 MCG (400 UNIT) tablet, Take 1 tablet by mouth Daily., Disp: , Rfl:   •  fluticasone (Flovent HFA) 220 MCG/ACT inhaler, Inhale 1 puff 2 (Two) Times a Day., Disp: 12 g, Rfl: 2  •  gabapentin (NEURONTIN) 600 MG tablet, Take 1 tablet by mouth Daily., Disp: , Rfl:   •  levalbuterol (XOPENEX HFA) 45 MCG/ACT inhaler, Inhale 1-2 puffs Every 4 (Four) Hours As Needed for Wheezing., Disp: 15 g, Rfl: 0  •  lisinopril (PRINIVIL,ZESTRIL) 20 MG tablet, TAKE 1 TABLET BY MOUTH DAILY. PATIENT WILL NEED APPOINTMENT FOR FURTHER REFILLS, Disp: 90 tablet, Rfl: 0  •  melatonin 5 MG tablet tablet, Take 1 tablet by mouth., Disp: , Rfl:   •  meloxicam (MOBIC) 15 MG tablet, TAKE 1 TABLET BY MOUTH  "DAILY. PATIENT WILL NEED APPOINTMENT FOR FURTHER REFILLS, Disp: 90 tablet, Rfl: 1  •  Multiple Vitamins-Minerals (MULTIVITAMIN ADULT PO), Take 1 tablet by mouth Daily., Disp: , Rfl:   •  Nystatin powder, Apply 1 application topically to the appropriate area as directed 2 (Two) Times a Day As Needed., Disp: , Rfl:   •  Red Yeast Rice 600 MG tablet, Take  by mouth., Disp: , Rfl:   •  traZODone (DESYREL) 100 MG tablet, Take 0.5-1 tablets by mouth At Night As Needed for Sleep., Disp: 90 tablet, Rfl: 3  •  Triamcinolone Acetonide (NASACORT) 55 MCG/ACT nasal inhaler, 2 sprays Daily., Disp: , Rfl:          Review of Systems   Constitutional: Negative for chills and diaphoresis.   HENT: Negative for trouble swallowing and voice change.    Eyes: Negative for visual disturbance.   Respiratory: Negative for shortness of breath.    Cardiovascular: Negative for chest pain and palpitations.   Gastrointestinal: Negative for abdominal pain and nausea.   Endocrine: Negative for polydipsia and polyphagia.   Genitourinary: Negative for hematuria.   Musculoskeletal: Negative for neck pain and neck stiffness.   Skin: Negative for color change and pallor.   Allergic/Immunologic: Negative for immunocompromised state.   Neurological: Negative for seizures and syncope.   Hematological: Negative for adenopathy.   Psychiatric/Behavioral: Negative for hallucinations, sleep disturbance and suicidal ideas.       I have reviewed and confirmed the accuracy of the ROS as documented by the MA/LPN/RN Charles aClderon MD      Objective   /72 (BP Location: Right arm, Patient Position: Sitting, Cuff Size: Adult)   Pulse 82   Temp 98 °F (36.7 °C) (Temporal)   Resp 18   Ht 172.7 cm (68\")   Wt 94.8 kg (209 lb) Comment: pt reports at home  SpO2 95%   BMI 31.78 kg/m²   BP Readings from Last 3 Encounters:   04/19/23 126/72   08/18/22 117/70   07/27/22 124/62     Wt Readings from Last 3 Encounters:   04/19/23 94.8 kg (209 lb)   08/18/22 96 kg (211 " lb 9.6 oz)   07/27/22 94.8 kg (209 lb)     Physical Exam  Constitutional:       Appearance: He is well-developed. He is not diaphoretic.   HENT:      Head: Normocephalic.      Right Ear: Tympanic membrane, ear canal and external ear normal.      Left Ear: Tympanic membrane, ear canal and external ear normal.      Nose: Nose normal.   Eyes:      General: Lids are normal.      Conjunctiva/sclera: Conjunctivae normal.      Pupils: Pupils are equal, round, and reactive to light.   Neck:      Thyroid: No thyromegaly.      Vascular: No carotid bruit or JVD.      Trachea: No tracheal deviation.   Cardiovascular:      Rate and Rhythm: Normal rate and regular rhythm.      Heart sounds: Normal heart sounds. No murmur heard.    No friction rub. No gallop.   Pulmonary:      Effort: Pulmonary effort is normal.      Breath sounds: Normal breath sounds. No stridor. No decreased breath sounds, wheezing or rales.   Abdominal:      General: Bowel sounds are normal. There is no distension.      Palpations: Abdomen is soft. There is no mass.      Tenderness: There is no abdominal tenderness. There is no guarding or rebound.      Hernia: No hernia is present.   Lymphadenopathy:      Head:      Right side of head: No submental, submandibular, tonsillar, preauricular, posterior auricular or occipital adenopathy.      Left side of head: No submental, submandibular, tonsillar, preauricular, posterior auricular or occipital adenopathy.      Cervical: No cervical adenopathy.   Skin:     General: Skin is warm and dry.      Coloration: Skin is not pale.   Neurological:      Mental Status: He is alert and oriented to person, place, and time.      Cranial Nerves: No cranial nerve deficit.      Sensory: No sensory deficit.      Coordination: Coordination normal.      Gait: Gait normal.      Deep Tendon Reflexes: Reflexes are normal and symmetric.         Data / Lab Results:    Hemoglobin A1C   Date Value Ref Range Status   02/16/2022 6.0 (H) 4.3 -  5.6 % Final     Comment:     (note)  A1C% Reference Range:  4.3 - 5.6  Normal range  5.7 - 6.4  Pre-diabetic -increased risk for developing diabetes mellitus.  >=6.5      Diabetic -diagnostic of diabetes mellitus.    Note: For diagnosis of diabetes in individuals without unequivocal   hyperglycemia, results should be confirmed by repeat testing.    Patients with conditions that shorten erythrocyte survival, such as   recovery from acute blood loss, hemolytic anemia, kidney disease,   or the presence of unstable hemogloblins like HbSS, HbCC, and HbSC   may yield falsely decreased HbA1c test results. Iron deficiency may   yield falsely increased HbA1c test results.   08/13/2019 5.9 % Final   10/22/2018 5.7 4.6 - 7.1 % Final   07/06/2018 5.8 4.6 - 7.1 % Final     Lab Results   Component Value Date     (H) 02/21/2020     Lab Results   Component Value Date     (H) 04/11/2023     (H) 04/09/2022     (H) 10/16/2020     Lab Results   Component Value Date    CHOL 184 03/19/2018    CHOL 204 (H) 02/22/2017     Lab Results   Component Value Date    TRIG 92 04/11/2023    TRIG 119 04/09/2022    TRIG 87 10/16/2020     Lab Results   Component Value Date    HDL 45 04/11/2023    HDL 43 04/09/2022    HDL 40 10/16/2020     Lab Results   Component Value Date    PSA 0.6 04/11/2023    PSA 0.4 04/09/2022    PSA 0.3 10/16/2020     Lab Results   Component Value Date    WBC 5.1 04/11/2023    HGB 15.5 04/11/2023    HCT 45.3 04/11/2023    MCV 95 04/11/2023     04/11/2023     Lab Results   Component Value Date    TSH 3.840 04/11/2023      Lab Results   Component Value Date    GLUCOSE 116 (H) 04/11/2023    BUN 22 04/11/2023    CREATININE 0.87 04/11/2023    EGFRIFNONA 85 10/16/2020    EGFRIFAFRI 98 10/16/2020    BCR 25 (H) 04/11/2023    K 4.4 04/11/2023    CO2 23 04/11/2023    CALCIUM 9.5 04/11/2023    PROTENTOTREF 7.1 04/11/2023    ALBUMIN 4.7 04/11/2023    LABIL2 2.0 04/11/2023    AST 26 04/11/2023    ALT 38  04/11/2023     No results found for: RADHA, RF, SEDRATE   No results found for: CRP   No results found for: IRON, TIBC, FERRITIN   No results found for: [id number]       Assessment & Plan      Medications        Problem List         LOS      COVID-19.      Much improved today, having some recurrent symptoms today with bronchospasm/some pleuritic chest pain, overall benign exam, restart prednisone.  Symptoms symptoms initially resolved with steroid Rx. Start as needed albuterol inhaler.    Has completed course antibiotics/antiviral/prednisone.  Continue quarantine.  Signs symptoms of progression to severe disease discussed.  Has been vaccinated.  Call return if worsening symptoms.  Health maintenance.  Doing well, vaccines current.  Discussed coated baby aspirin daily.  Discussed health maintenance, screening test, lifestyle modification.  Check AAA screening ultrasound.  Lumbar disc disease.      Improving today status post fusion L5-S1 Dr Delgado on 2/15, advancing activity, has completed course PT.. .  Positive bilateral lower extremity radiculopathy.  Followed by pain management RFA planned.  Status post lumbar disc surgery by to Dr. Mark.  Postop hemoglobin 13.5, normal on recheck.  Improved on nightly gabapentin, add Cymbalta.  Left knee pain.  Improved/resolved.  Flare OA versus medial meniscus tear.  Recommend joint injection, MRI, orthopedics referral he states he will consider.  Ice, NSAIDs, rehabilitation exercises discussed.  Call or return if persistent symptoms.  Osteoarthritis left hip.    Improved today status post Replacement per Dr. Elias, advancing activity.  Hypertension.  Improved, normotensive today, has lost weight.  Decrease lisinopril to 10 mg daily.  Monitor blood pressure at home.  Follow-up recheck.  Treadmill echo benign 2021.  Carotid stenosis.  Less than 50% obstruction bilaterally per carotid Dopplers 2021.  Continue risk factor reduction.  Elevated blood sugar..  Improved, diet  controlled.  Follow-up recheck.  A1c 5.8  Hyperlipidemia.  Worse this year, mild elevation, has done well red yeast rice, restart overall good control, increase to 2400 mg daily..  Discussed diet, exercise, lifestyle modification.  Follow-up recheck.  KRISTA.  Doing well on CPAP.  Prostate screening.  PSA benign.  Family history of colon cancer.  Mom,  in her 50s.  Colonoscopy normal , repeat planned 5 years/due for repeat..  Recommend repeat he agrees to schedule appointment with gastroenterology in [place].  Sleep disorder.  Refractory to melatonin start as needed trazodone.  Sleep hygiene discussed.        Diagnoses and all orders for this visit:    1. Welcome to Medicare preventive visit (Primary)  -     POCT urinalysis dipstick, manual  -     ECG 12 Lead    2. Hypertension, benign    3. Mixed hyperlipidemia    4. Class 1 obesity due to excess calories without serious comorbidity with body mass index (BMI) of 31.0 to 31.9 in adult  Assessment & Plan:  Patient's (Body mass index is 31.78 kg/m².) indicates that they are obese (BMI >30) with health conditions that include hypertension and dyslipidemias . Weight is worsening. BMI  is above average; BMI management plan is completed. We discussed portion control and increasing exercise.       5. Screening PSA (prostate specific antigen)    6. Colon cancer screening    7. Elevated blood sugar  -     POC Glycosylated Hemoglobin (Hb A1C)    8. Insomnia, unspecified type    9. Lumbar disc disease    10. Shortness of breath            Expected course, medications, and adverse effects discussed.  Call or return if worsening or persistent symptoms.  I wore protective equipment throughout this patient encounter including a mask, gloves, and eye protection.  Hand hygiene was performed before donning protective equipment and after removal when leaving the room. The complete contents of the Assessment and Plan and Data/Lab Results as documented above have been reviewed  and addressed by myself with the patient today as part of an ongoing evaluation / treatment plan.  If some of the documentation has been copied from a previous note and is unchanged it indicates that this problem / plan has been assessed today but is stable from a previous visit and no changes have been recommended.

## 2023-04-18 NOTE — PROGRESS NOTES
Subjective   Rudy Henriquez Jr. is a 67 y.o. male.     Chief Complaint   Patient presents with   • Welcome To Medicare   • Hypertension   • Hyperlipidemia       The patient is here: to discuss health maintenance and disease prevention to follow up on multiple medical problems.  Last comprehensive physical was on 4/13/2022.  Previous physical was performed by  Charles Calderon MD  Overall has: moderate activity with work/home activities, exercises 2 - 3 times per week, good appetite, feels well with minor complaints, good energy level and is sleeping well. Nutrition: appropriate balanced diet, balanced diet and eating a variety of foods. Last tetanus shot was 4/21/2021. Patient's last carotid doppler was: 11/13/2020 Patient's last stress test was: 11/14/2020 Patient's last PSA was: 0.6 on 4/11/2023 Patient's last DEXA was on 12/13/2021.    Last Completed Colonoscopy          COLORECTAL CANCER SCREENING (COLONOSCOPY - Every 10 Years) Next due on 4/27/2027 04/27/2017  SCANNED - COLONOSCOPY    03/11/2010  SCANNED - COLONOSCOPY                History of Present Illness     Recent Hospitalizations:  No hospitalization(s) within the last year..  ccc    I personally reviewed and updated the patient's allergies, medications, problem list, and past medical, surgical, social, and family history. I have reviewed and confirmed the accuracy of the HPI and ROS as documented by the MA/LPN/RN Amy Caro MA      Family History   Problem Relation Age of Onset   • Colon cancer Mother    • Lung cancer Father    • Diabetes Sister        Social History     Tobacco Use   • Smoking status: Never   • Smokeless tobacco: Never   Vaping Use   • Vaping Use: Never used   Substance Use Topics   • Alcohol use: No   • Drug use: No       Past Surgical History:   Procedure Laterality Date   • BACK SURGERY  06/07/2018    on L4 and L5 by dr landa   • BACK SURGERY  07/13/2017    L4-L5 Dr. Landa Widen spinal column   • HIP SURGERY Left 03/04/2020    • ORIF CALCANEAL FRACTURE Left 05/2018    [facility]   • SPINE SURGERY N/A 02/15/2022    Dr Delagdo @ [facility] spine   • TONSILLECTOMY AND ADENOIDECTOMY      at age 48       Patient Active Problem List   Diagnosis   • Acute reaction to stress   • Asthma   • Obstructive sleep apnea   • Acute left-sided low back pain with right-sided sciatica   • Benign neoplasm of colon   • Carpal tunnel syndrome   • Welcome to Medicare preventive visit   • Hyperlipidemia   • Hypertension, benign   • Impotence of organic origin   • Lumbar disc disease   • Sleep apnea   • Tremor   • Memory loss   • Class 1 obesity due to excess calories without serious comorbidity with body mass index (BMI) of 31.0 to 31.9 in adult   • Osteoarthritis of left hip   • Atopic dermatitis   • Screening PSA (prostate specific antigen)   • Colon cancer screening   • Chronic radicular lumbar pain   • Primary osteoarthritis of left hip   • Elevated blood sugar   • Lumbar post-laminectomy syndrome   • Degeneration of lumbar intervertebral disc   • Degenerative spondylolisthesis   • Pleuritic chest pain   • COVID-19 virus infection         Current Outpatient Medications:   •  aspirin 81 MG chewable tablet, Chew 1 tablet Daily., Disp: , Rfl:   •  azelastine (ASTELIN) 0.1 % nasal spray, , Disp: , Rfl:   •  cholecalciferol (VITAMIN D3) 10 MCG (400 UNIT) tablet, Take 1 tablet by mouth Daily., Disp: , Rfl:   •  fluticasone (Flovent HFA) 220 MCG/ACT inhaler, Inhale 1 puff 2 (Two) Times a Day., Disp: 12 g, Rfl: 2  •  gabapentin (NEURONTIN) 600 MG tablet, Take 1 tablet by mouth Daily., Disp: , Rfl:   •  levalbuterol (XOPENEX HFA) 45 MCG/ACT inhaler, Inhale 1-2 puffs Every 4 (Four) Hours As Needed for Wheezing., Disp: 15 g, Rfl: 0  •  lisinopril (PRINIVIL,ZESTRIL) 20 MG tablet, TAKE 1 TABLET BY MOUTH DAILY. PATIENT WILL NEED APPOINTMENT FOR FURTHER REFILLS, Disp: 90 tablet, Rfl: 0  •  melatonin 5 MG tablet tablet, Take 1 tablet by mouth., Disp: , Rfl:   •  meloxicam  "(MOBIC) 15 MG tablet, TAKE 1 TABLET BY MOUTH DAILY. PATIENT WILL NEED APPOINTMENT FOR FURTHER REFILLS, Disp: 90 tablet, Rfl: 1  •  Multiple Vitamins-Minerals (MULTIVITAMIN ADULT PO), Take 1 tablet by mouth Daily., Disp: , Rfl:   •  Nystatin powder, Apply 1 application topically to the appropriate area as directed 2 (Two) Times a Day As Needed., Disp: , Rfl:   •  Red Yeast Rice 600 MG tablet, Take  by mouth., Disp: , Rfl:   •  traZODone (DESYREL) 100 MG tablet, Take 0.5-1 tablets by mouth At Night As Needed for Sleep., Disp: 90 tablet, Rfl: 3  •  Triamcinolone Acetonide (NASACORT) 55 MCG/ACT nasal inhaler, 2 sprays Daily., Disp: , Rfl:       Objective   /72 (BP Location: Right arm, Patient Position: Sitting, Cuff Size: Adult)   Pulse 82   Temp 98 °F (36.7 °C) (Temporal)   Resp 18   Ht 172.7 cm (68\")   Wt 94.8 kg (209 lb) Comment: pt reports at home  SpO2 95%   BMI 31.78 kg/m²   BP Readings from Last 3 Encounters:   04/19/23 126/72   08/18/22 117/70   07/27/22 124/62     Wt Readings from Last 3 Encounters:   04/19/23 94.8 kg (209 lb)   08/18/22 96 kg (211 lb 9.6 oz)   07/27/22 94.8 kg (209 lb)         Age-appropriate Screening Schedule:  Refer to the list below for future screening recommendations based on patient's age, sex and/or medical conditions. Orders for these [place] [place] tests are listed in the plan section. The patient has been provided with a written plan.    Health Maintenance   Topic Date Due   • Pneumococcal Vaccine 65+ (1 - PCV) Never done   • COVID-19 Vaccine (3 - Booster for Pfizer series) 06/16/2021   • INFLUENZA VACCINE  08/01/2023   • LIPID PANEL  04/11/2024   • ANNUAL WELLNESS VISIT  04/19/2024   • COLORECTAL CANCER SCREENING  04/27/2027   • TDAP/TD VACCINES (2 - Td or Tdap) 03/12/2029   • HEPATITIS C SCREENING  Addressed   • ZOSTER VACCINE  Discontinued       Depression Screen:       4/19/2023     2:56 PM   PHQ-2/PHQ-9 Depression Screening   Little Interest or Pleasure in Doing " Things 0-->not at all   Feeling Down, Depressed or Hopeless 0-->not at all   PHQ-9: Brief Depression Severity Measure Score 0     I spent more than 16 minutes asking patient questions, counseling and documenting in the chart.    Health Habits and Functional and Cognitive Screenin/19/2023     2:00 PM   Functional & Cognitive Status   Do you have difficulty preparing food and eating? No   Do you have difficulty bathing yourself, getting dressed or grooming yourself? No   Do you have difficulty using the toilet? No   Do you have difficulty moving around from place to place? No   Do you have trouble with steps or getting out of a bed or a chair? No   Current Diet Well Balanced Diet   Dental Exam Not up to date   Eye Exam Up to date   Exercise (times per week) 4 times per week   Current Exercises Include Walking   Do you need help using the phone?  No   Are you deaf or do you have serious difficulty hearing?  No   Do you need help with transportation? No   Do you need help shopping? No   Do you need help preparing meals?  No   Do you need help with housework?  No   Do you need help with laundry? No   Do you need help taking your medications? No   Do you need help managing money? No   Do you ever drive or ride in a car without wearing a seat belt? No   Have you felt unusual stress, anger or loneliness in the last month? No   Who do you live with? Spouse   If you need help, do you have trouble finding someone available to you? No   Do you have difficulty concentrating, remembering or making decisions? No       Does the patient have evidence of cognitive impairment? No    Advance Care Planning:  ACP discussion was held with the patient during this visit. Patient has an advance directive in EMR which is still valid.      A face-to-face visit was completed today with patient.  Counseling explanation, and discussion of advanced directives was performed.   The last advanced care visit was performed in .  In a near  life ending situation, from which he is not expected to recover functionally, and he is not able to speak for him, he does not want life sustaining measures. We discussed feeding tubes, ventilators and cardiac support as well as dialysis.    I spent more than 16 minutes discussing Advanced Care Planning information and documenting in the chart.    Identification of Risk Factors:  Risk factors include: Advance Directive Discussion  Colon Cancer Screening  Dementia/Memory   Fall Risk  Immunizations Discussed/Encouraged (specific immunizations; Prevnar 20 (Pneumococcal 20-valent conjugate) and COVID19 )  Obesity/Overweight   Prostate Cancer Screening   Urinary Incontinence.    Compared to one year ago, the patient feels his physical health is the same.  Compared to one year ago, the patient feels his mental health is the same.    Patient Self-Management and Personalized Health Advice  The patient has been provided with information about: diet, exercise, weight management, fall prevention, designing advance directives and supplements and preventive services including:   · Annual Wellness Visit (AWV)  · Bone Density Measurements  · Colorectal Cancer Screening, Colonoscopy  · Depression Screening (15 minutes face to face, Code )  · Influenza Vaccine and Administration  · Lung Cancer Screening Counseling and Annual Screening for Lung Cancer with Low Dose Computed Tomography (LDCT); (use of smartset for low dose CT for lung cancer screening for documentation and orders)  · Pneumococcal Vaccine and Administration.      Assessment & Plan      Medications        Problem List         LOS      Diagnoses and all orders for this visit:    1. Welcome to Medicare preventive visit (Primary)  -     POCT urinalysis dipstick, manual  -     ECG 12 Lead    2. Hypertension, benign    3. Mixed hyperlipidemia    4. Class 1 obesity due to excess calories without serious comorbidity with body mass index (BMI) of 31.0 to 31.9 in  adult  Assessment & Plan:  Patient's (Body mass index is 31.78 kg/m².) indicates that they are obese (BMI >30) with health conditions that include hypertension and dyslipidemias . Weight is worsening. BMI  is above average; BMI management plan is completed. We discussed portion control and increasing exercise.       5. Screening PSA (prostate specific antigen)    6. Colon cancer screening    7. Elevated blood sugar  -     POC Glycosylated Hemoglobin (Hb A1C)    8. Insomnia, unspecified type    9. Lumbar disc disease    10. Shortness of breath      Medicare wellness.  Discussed health maintenance, routine immunizations, screening tests, lifestyle modification.

## 2023-04-19 ENCOUNTER — OFFICE VISIT (OUTPATIENT)
Dept: FAMILY MEDICINE CLINIC | Facility: CLINIC | Age: 68
End: 2023-04-19
Payer: MEDICARE

## 2023-04-19 VITALS
HEART RATE: 82 BPM | TEMPERATURE: 98 F | DIASTOLIC BLOOD PRESSURE: 72 MMHG | RESPIRATION RATE: 18 BRPM | HEIGHT: 68 IN | BODY MASS INDEX: 31.67 KG/M2 | SYSTOLIC BLOOD PRESSURE: 126 MMHG | OXYGEN SATURATION: 95 % | WEIGHT: 209 LBS

## 2023-04-19 DIAGNOSIS — F43.0 ACUTE REACTION TO STRESS: ICD-10-CM

## 2023-04-19 DIAGNOSIS — E66.09 CLASS 1 OBESITY DUE TO EXCESS CALORIES WITHOUT SERIOUS COMORBIDITY WITH BODY MASS INDEX (BMI) OF 31.0 TO 31.9 IN ADULT: ICD-10-CM

## 2023-04-19 DIAGNOSIS — E78.2 MIXED HYPERLIPIDEMIA: ICD-10-CM

## 2023-04-19 DIAGNOSIS — Z12.11 COLON CANCER SCREENING: ICD-10-CM

## 2023-04-19 DIAGNOSIS — M43.10 DEGENERATIVE SPONDYLOLISTHESIS: ICD-10-CM

## 2023-04-19 DIAGNOSIS — R73.9 ELEVATED BLOOD SUGAR: ICD-10-CM

## 2023-04-19 DIAGNOSIS — Z00.00 WELCOME TO MEDICARE PREVENTIVE VISIT: Primary | ICD-10-CM

## 2023-04-19 DIAGNOSIS — M51.9 LUMBAR DISC DISEASE: ICD-10-CM

## 2023-04-19 DIAGNOSIS — G47.00 INSOMNIA, UNSPECIFIED TYPE: ICD-10-CM

## 2023-04-19 DIAGNOSIS — Z82.49 FAMILY HISTORY OF ABDOMINAL AORTIC ANEURYSM (AAA): ICD-10-CM

## 2023-04-19 DIAGNOSIS — I10 HYPERTENSION, BENIGN: Chronic | ICD-10-CM

## 2023-04-19 DIAGNOSIS — Z12.5 SCREENING PSA (PROSTATE SPECIFIC ANTIGEN): ICD-10-CM

## 2023-04-19 DIAGNOSIS — R06.02 SHORTNESS OF BREATH: ICD-10-CM

## 2023-04-19 LAB
BILIRUB BLD-MCNC: NEGATIVE MG/DL
CLARITY, POC: CLEAR
COLOR UR: YELLOW
EXPIRATION DATE: NORMAL
GLUCOSE UR STRIP-MCNC: NEGATIVE MG/DL
HBA1C MFR BLD: 5.8 %
KETONES UR QL: NEGATIVE
LEUKOCYTE EST, POC: NEGATIVE
Lab: NORMAL
NITRITE UR-MCNC: NEGATIVE MG/ML
PH UR: 6.5 [PH] (ref 5–8)
PROT UR STRIP-MCNC: ABNORMAL MG/DL
RBC # UR STRIP: NEGATIVE /UL
SP GR UR: 1.01 (ref 1–1.03)
UROBILINOGEN UR QL: NORMAL

## 2023-04-19 RX ORDER — TRAZODONE HYDROCHLORIDE 100 MG/1
50-100 TABLET ORAL NIGHTLY PRN
Qty: 90 TABLET | Refills: 3 | Status: SHIPPED | OUTPATIENT
Start: 2023-04-19

## 2023-04-19 RX ORDER — GABAPENTIN 600 MG/1
600 TABLET ORAL DAILY
COMMUNITY
End: 2023-04-19 | Stop reason: SDUPTHER

## 2023-04-19 RX ORDER — FLUTICASONE PROPIONATE 220 UG/1
1 AEROSOL, METERED RESPIRATORY (INHALATION)
Qty: 12 G | Refills: 2 | Status: SHIPPED | OUTPATIENT
Start: 2023-04-19

## 2023-04-19 RX ORDER — MELOXICAM 15 MG/1
15 TABLET ORAL DAILY
Qty: 90 TABLET | Refills: 1 | Status: SHIPPED | OUTPATIENT
Start: 2023-04-19

## 2023-04-19 RX ORDER — DULOXETIN HYDROCHLORIDE 30 MG/1
30 CAPSULE, DELAYED RELEASE ORAL DAILY
Qty: 30 CAPSULE | Refills: 5 | Status: SHIPPED | OUTPATIENT
Start: 2023-04-19 | End: 2023-05-08 | Stop reason: SDUPTHER

## 2023-04-19 RX ORDER — LISINOPRIL 20 MG/1
20 TABLET ORAL DAILY
Qty: 90 TABLET | Refills: 0 | Status: SHIPPED | OUTPATIENT
Start: 2023-04-19

## 2023-04-19 RX ORDER — GABAPENTIN 600 MG/1
600 TABLET ORAL NIGHTLY PRN
Qty: 30 TABLET | Refills: 2 | Status: SHIPPED | OUTPATIENT
Start: 2023-04-19

## 2023-04-19 NOTE — ASSESSMENT & PLAN NOTE
Patient's (Body mass index is 31.78 kg/m².) indicates that they are obese (BMI >30) with health conditions that include hypertension and dyslipidemias . Weight is worsening. BMI  is above average; BMI management plan is completed. We discussed portion control and increasing exercise.

## 2023-04-19 NOTE — LETTER
April 19, 2023     Patient: Rudy Henriquez .   YOB: 1955   Date of Visit: 4/19/2023       To Whom It May Concern:    It is my medical opinion that Rudy Henriquez  should not lift greater than 30 lbs or have periods of prolonged standing .           Sincerely,        Charles Calderon MD    CC:   No Recipients

## 2023-05-08 DIAGNOSIS — F43.0 ACUTE REACTION TO STRESS: ICD-10-CM

## 2023-05-08 RX ORDER — DULOXETIN HYDROCHLORIDE 30 MG/1
30 CAPSULE, DELAYED RELEASE ORAL DAILY
Qty: 90 CAPSULE | Refills: 1 | Status: SHIPPED | OUTPATIENT
Start: 2023-05-08

## 2023-05-25 NOTE — PROGRESS NOTES
Subjective   Rudy Henriquez Jr. is a 67 y.o. male.     Chief Complaint   Patient presents with   • Wrist Pain   • Foot Pain       History of Present Illness  Patient states he injured his wrist approx. One month ago. He was making his bed and all of a sudden he felt a pop sound and it was swollen real big and was also very tender. He states that the swelling has gone down some, but there is still a lump on the wrist that wont go down. He has not seen anyone for this injury yet. He is also here to discuss a spot on his foot. He states he did not injure it, but his foot is tender and there is a red spot. He states that when you touch it, it gets itchy and does burn at times. He cannot walk long distances because of it, and it does not matter what type of shoes he wears.   Wrist Pain   The pain is present in the right wrist. This is a new problem. The current episode started more than 1 month ago. There has been a history of trauma. The problem occurs intermittently. The problem has been unchanged. The pain is at a severity of 3/10. The pain is mild. Associated symptoms include a limited range of motion. He has tried acetaminophen and NSAIDS for the symptoms. The treatment provided mild relief.            I personally reviewed and updated the patient's allergies, medications, problem list, and past medical, surgical, social, and family history. I have reviewed and confirmed the accuracy of the History of Present Illness and Review of Symptoms as documented by the MA/CARLENE/RN. Charles Calderon MD    Family History   Problem Relation Age of Onset   • Colon cancer Mother    • Lung cancer Father    • Diabetes Sister        Social History     Tobacco Use   • Smoking status: Never     Passive exposure: Never   • Smokeless tobacco: Never   Vaping Use   • Vaping Use: Never used   Substance Use Topics   • Alcohol use: No   • Drug use: No       Past Surgical History:   Procedure Laterality Date   • BACK SURGERY  06/07/2018    on L4  and L5 by dr mark   • BACK SURGERY  07/13/2017    L4-L5 Dr. Mark Widen spinal column   • HIP SURGERY Left 03/04/2020   • ORIF CALCANEAL FRACTURE Left 05/2018    [facility]   • SPINE SURGERY N/A 02/15/2022    Dr Delgado @ [facility]   • TONSILLECTOMY AND ADENOIDECTOMY      at age 48       Patient Active Problem List   Diagnosis   • Acute reaction to stress   • Asthma   • Obstructive sleep apnea   • Acute left-sided low back pain with right-sided sciatica   • Benign neoplasm of colon   • Carpal tunnel syndrome   • Welcome to Medicare preventive visit   • Hyperlipidemia   • Hypertension, benign   • Impotence of organic origin   • Lumbar disc disease   • Sleep apnea   • Tremor   • Memory loss   • Class 1 obesity due to excess calories without serious comorbidity with body mass index (BMI) of 31.0 to 31.9 in adult   • Osteoarthritis of left hip   • Atopic dermatitis   • Screening PSA (prostate specific antigen)   • Colon cancer screening   • Chronic radicular lumbar pain   • Primary osteoarthritis of left hip   • Elevated blood sugar   • Lumbar post-laminectomy syndrome   • Degeneration of lumbar intervertebral disc   • Degenerative spondylolisthesis   • Pleuritic chest pain   • COVID-19 virus infection   • Left foot pain   • Right wrist pain   • Tenosynovitis   • Plantar wart         Current Outpatient Medications:   •  aspirin 81 MG chewable tablet, Chew 1 tablet Daily., Disp: , Rfl:   •  cholecalciferol (VITAMIN D3) 10 MCG (400 UNIT) tablet, Take 1 tablet by mouth Daily., Disp: , Rfl:   •  DULoxetine (CYMBALTA) 30 MG capsule, Take 1 capsule by mouth Daily., Disp: 90 capsule, Rfl: 1  •  fluticasone (Flovent HFA) 220 MCG/ACT inhaler, Inhale 1 puff 2 (Two) Times a Day., Disp: 12 g, Rfl: 2  •  gabapentin (NEURONTIN) 600 MG tablet, Take 1 tablet by mouth At Night As Needed (moderate pain)., Disp: 30 tablet, Rfl: 2  •  levalbuterol (XOPENEX HFA) 45 MCG/ACT inhaler, Inhale 1-2 puffs Every 4 (Four) Hours As Needed for  Wheezing., Disp: 15 g, Rfl: 0  •  lisinopril (PRINIVIL,ZESTRIL) 20 MG tablet, Take 1 tablet by mouth Daily. Patient will need appointment for further refills, Disp: 90 tablet, Rfl: 0  •  melatonin 5 MG tablet tablet, Take 1 tablet by mouth., Disp: , Rfl:   •  meloxicam (MOBIC) 15 MG tablet, Take 1 tablet by mouth Daily. Patient will need appointment for further refills, Disp: 90 tablet, Rfl: 1  •  Multiple Vitamins-Minerals (MULTIVITAMIN ADULT PO), Take 1 tablet by mouth Daily., Disp: , Rfl:   •  Nystatin powder, Apply 1 application topically to the appropriate area as directed 2 (Two) Times a Day As Needed., Disp: , Rfl:   •  Red Yeast Rice 600 MG tablet, Take  by mouth., Disp: , Rfl:   •  azelastine (ASTELIN) 0.1 % nasal spray, , Disp: , Rfl:   •  cimetidine (TAGAMET) 400 MG tablet, Take 1 tablet by mouth Daily for 180 days., Disp: 30 tablet, Rfl: 5  •  mometasone (ASMANEX TWISTHALER) inhaler 220 mcg/inhalation, Inhale 2 puffs Daily for 30 days., Disp: 1 each, Rfl: 11  •  predniSONE (DELTASONE) 5 MG tablet, 40mg x 3 days, 20mg x 3 days, 10mg x 3 days, 5mg x 3 days, Disp: 45 tablet, Rfl: 0  •  traZODone (DESYREL) 100 MG tablet, Take 0.5-1 tablets by mouth At Night As Needed for Sleep. (Patient not taking: Reported on 5/26/2023), Disp: 90 tablet, Rfl: 3  •  Triamcinolone Acetonide (NASACORT) 55 MCG/ACT nasal inhaler, 2 sprays Daily. (Patient not taking: Reported on 5/26/2023), Disp: , Rfl:          Review of Systems   Constitutional: Negative for chills and diaphoresis.   Eyes: Negative for visual disturbance.   Respiratory: Negative for shortness of breath.    Cardiovascular: Negative for chest pain and palpitations.   Gastrointestinal: Negative for abdominal pain and nausea.   Endocrine: Negative for polydipsia and polyphagia.   Musculoskeletal: Negative for neck stiffness.   Skin: Negative for color change and pallor.   Neurological: Negative for seizures and syncope.   Hematological: Negative for adenopathy.  "  Psychiatric/Behavioral: Negative for hallucinations and suicidal ideas.       I have reviewed and confirmed the accuracy of the ROS as documented by the MA/LPN/RN Charles Calderon MD      Objective   /72 (BP Location: Right arm, Patient Position: Sitting, Cuff Size: Adult)   Pulse 78   Temp 98.2 °F (36.8 °C) (Temporal)   Resp 18   Ht 172.7 cm (67.99\")   Wt 93 kg (205 lb) Comment: Per patient- refused to weigh  SpO2 96%   BMI 31.18 kg/m²   BP Readings from Last 3 Encounters:   05/26/23 140/72   04/19/23 126/72   08/18/22 117/70     Wt Readings from Last 3 Encounters:   05/26/23 93 kg (205 lb)   04/19/23 94.8 kg (209 lb)   08/18/22 96 kg (211 lb 9.6 oz)     Physical Exam  Constitutional:       Appearance: He is well-developed. He is not diaphoretic.   HENT:      Head: Normocephalic.      Right Ear: Tympanic membrane, ear canal and external ear normal.      Left Ear: Tympanic membrane, ear canal and external ear normal.      Nose: Nose normal.   Eyes:      General: Lids are normal.      Conjunctiva/sclera: Conjunctivae normal.      Pupils: Pupils are equal, round, and reactive to light.   Neck:      Thyroid: No thyromegaly.      Vascular: No carotid bruit or JVD.      Trachea: No tracheal deviation.   Cardiovascular:      Rate and Rhythm: Normal rate and regular rhythm.      Heart sounds: Normal heart sounds. No murmur heard.    No friction rub. No gallop.   Pulmonary:      Effort: Pulmonary effort is normal.      Breath sounds: Normal breath sounds. No stridor. No decreased breath sounds, wheezing or rales.   Abdominal:      General: Bowel sounds are normal. There is no distension.      Palpations: Abdomen is soft. There is no mass.      Tenderness: There is no abdominal tenderness. There is no guarding or rebound.      Hernia: No hernia is present.   Musculoskeletal:      Right forearm: Normal. No swelling, edema, deformity or tenderness.      Left forearm: Normal. No swelling, edema, deformity or " tenderness.      Right wrist: Normal. No swelling, deformity, effusion, tenderness or crepitus. Normal range of motion.      Left wrist: Normal. No swelling, deformity, effusion, tenderness or crepitus. Normal range of motion.      Right hand: Normal. No swelling, deformity or tenderness. Normal range of motion. Normal strength. Normal strength of finger abduction, thumb/finger opposition and wrist extension. Normal sensation. Normal sensation of the ulnar distribution, median distribution and radial distribution. There is no disruption of two-point discrimination.      Left hand: Normal. No swelling, deformity or tenderness. Normal range of motion. Normal strength. Normal strength of finger abduction, thumb/finger opposition and wrist extension. Normal sensation. Normal sensation of the ulnar distribution, median distribution and radial distribution. There is no disruption of two-point discrimination.      Comments: Tinel's, Phalen's neg.  Finkelstein neg.   Lymphadenopathy:      Head:      Right side of head: No submental, submandibular, tonsillar, preauricular, posterior auricular or occipital adenopathy.      Left side of head: No submental, submandibular, tonsillar, preauricular, posterior auricular or occipital adenopathy.      Cervical: No cervical adenopathy.   Skin:     General: Skin is warm and dry.      Coloration: Skin is not pale.      Comments: Plantar wart, foot   Neurological:      Mental Status: He is alert and oriented to person, place, and time.      Cranial Nerves: No cranial nerve deficit.      Sensory: No sensory deficit.      Coordination: Coordination normal.      Gait: Gait normal.      Deep Tendon Reflexes: Reflexes are normal and symmetric.         Data / Lab Results:    Hemoglobin A1C   Date Value Ref Range Status   04/19/2023 5.8 % Final   02/16/2022 6.0 (H) 4.3 - 5.6 % Final     Comment:     (note)  A1C% Reference Range:  4.3 - 5.6  Normal range  5.7 - 6.4  Pre-diabetic -increased risk  for developing diabetes mellitus.  >=6.5      Diabetic -diagnostic of diabetes mellitus.    Note: For diagnosis of diabetes in individuals without unequivocal   hyperglycemia, results should be confirmed by repeat testing.    Patients with conditions that shorten erythrocyte survival, such as   recovery from acute blood loss, hemolytic anemia, kidney disease,   or the presence of unstable hemogloblins like HbSS, HbCC, and HbSC   may yield falsely decreased HbA1c test results. Iron deficiency may   yield falsely increased HbA1c test results.   08/13/2019 5.9 % Final   10/22/2018 5.7 4.6 - 7.1 % Final     Lab Results   Component Value Date     (H) 02/21/2020     Lab Results   Component Value Date     (H) 04/11/2023     (H) 04/09/2022     (H) 10/16/2020     Lab Results   Component Value Date    CHOL 184 03/19/2018    CHOL 204 (H) 02/22/2017     Lab Results   Component Value Date    TRIG 92 04/11/2023    TRIG 119 04/09/2022    TRIG 87 10/16/2020     Lab Results   Component Value Date    HDL 45 04/11/2023    HDL 43 04/09/2022    HDL 40 10/16/2020     Lab Results   Component Value Date    PSA 0.6 04/11/2023    PSA 0.4 04/09/2022    PSA 0.3 10/16/2020     Lab Results   Component Value Date    WBC 5.1 04/11/2023    HGB 15.5 04/11/2023    HCT 45.3 04/11/2023    MCV 95 04/11/2023     04/11/2023     Lab Results   Component Value Date    TSH 3.840 04/11/2023      Lab Results   Component Value Date    GLUCOSE 116 (H) 04/11/2023    BUN 22 04/11/2023    CREATININE 0.87 04/11/2023    EGFRIFNONA 85 10/16/2020    EGFRIFAFRI 98 10/16/2020    BCR 25 (H) 04/11/2023    K 4.4 04/11/2023    CO2 23 04/11/2023    CALCIUM 9.5 04/11/2023    PROTENTOTREF 7.1 04/11/2023    ALBUMIN 4.7 04/11/2023    LABIL2 2.0 04/11/2023    AST 26 04/11/2023    ALT 38 04/11/2023     No results found for: RADHA, RF, SEDRATE   No results found for: CRP   No results found for: IRON, TIBC, FERRITIN   No results found for: [id number]      Procedure note: Plantar wart foot frozen repeatedly today with liquid nitrogen in usual fashion, he tolerated procedure well, no complications.    Assessment & Plan      Medications        Problem List         LOS    Wrist sprain.  With de Quervain's tenosynovitis.  Ice, add prednisone, rehabilitation  Exercises discussed.  Consider hand surgery referral if persistent symptoms.  Plantar wart.  Frozen today.  Topical care discussed.  Add cimetidine.  Follow-up for repeat freezing if persistent symptoms.  COVID-19.      Much improved today, having some recurrent symptoms today with bronchospasm/some pleuritic chest pain, overall benign exam, restart prednisone.  Symptoms symptoms initially resolved with steroid Rx. Start as needed albuterol inhaler.    Has completed course antibiotics/antiviral/prednisone.  Continue quarantine.  Signs symptoms of progression to severe disease discussed.  Has been vaccinated.  Call return if worsening symptoms.  Health maintenance.  Doing well, vaccines current.  Discussed coated baby aspirin daily.  Discussed health maintenance, screening test, lifestyle modification.  Check AAA screening ultrasound.  Lumbar disc disease.      Improving today status post fusion L5-S1 Dr Delgado on 2/15, advancing activity, has completed course PT.. .  Positive bilateral lower extremity radiculopathy.  Followed by pain management RFA planned.  Status post lumbar disc surgery by to Dr. Mark.  Postop hemoglobin 13.5, normal on recheck.  Improved on nightly gabapentin, add Cymbalta.  Left knee pain.  Improved/resolved.  Flare OA versus medial meniscus tear.  Recommend joint injection, MRI, orthopedics referral he states he will consider.  Ice, NSAIDs, rehabilitation exercises discussed.  Call or return if persistent symptoms.  Osteoarthritis left hip.    Improved today status post Replacement per Dr. Elias, advancing activity.  Hypertension.  Improved, normotensive today, has lost weight.  Decrease  lisinopril to 10 mg daily.  Monitor blood pressure at home.  Follow-up recheck.  Treadmill echo benign .  Carotid stenosis.  Less than 50% obstruction bilaterally per carotid Dopplers .  Continue risk factor reduction.  Elevated blood sugar..  Improved, diet controlled.  Follow-up recheck.  A1c 5.8  Hyperlipidemia.  Worse this year, mild elevation, has done well red yeast rice, restart overall good control, increase to 2400 mg daily..  Discussed diet, exercise, lifestyle modification.  Follow-up recheck.  KRISTA.  Doing well on CPAP.  Prostate screening.  PSA benign.  Family history of colon cancer.  Mom,  in her 50s.  Colonoscopy normal , repeat planned 5 years/due for repeat..  Recommend repeat he agrees to schedule appointment with gastroenterology in [place].  Sleep disorder.  Refractory to melatonin start as needed trazodone.  Sleep hygiene discussed.  Asthma.  Recommend daily inhaled steroid, insurance not covering fluticasone, start Asmanex, consider Pulmicort.        Diagnoses and all orders for this visit:    1. Left foot pain (Primary)  -     predniSONE (DELTASONE) 5 MG tablet; 40mg x 3 days, 20mg x 3 days, 10mg x 3 days, 5mg x 3 days  Dispense: 45 tablet; Refill: 0  -     cimetidine (TAGAMET) 400 MG tablet; Take 1 tablet by mouth Daily for 180 days.  Dispense: 30 tablet; Refill: 5    2. Right wrist pain  -     predniSONE (DELTASONE) 5 MG tablet; 40mg x 3 days, 20mg x 3 days, 10mg x 3 days, 5mg x 3 days  Dispense: 45 tablet; Refill: 0  -     cimetidine (TAGAMET) 400 MG tablet; Take 1 tablet by mouth Daily for 180 days.  Dispense: 30 tablet; Refill: 5    3. Class 1 obesity due to excess calories without serious comorbidity with body mass index (BMI) of 31.0 to 31.9 in adult  Assessment & Plan:  Patient's (Body mass index is 31.18 kg/m².) indicates that they are obese (BMI >30) with health conditions that include hypertension and dyslipidemias . Weight is unchanged. BMI  is above average; BMI  management plan is completed. We discussed portion control and increasing exercise.       4. Shortness of breath  -     mometasone (ASMANEX TWISTHALER) inhaler 220 mcg/inhalation; Inhale 2 puffs Daily for 30 days.  Dispense: 1 each; Refill: 11    5. Tenosynovitis    6. Plantar wart            Expected course, medications, and adverse effects discussed.  Call or return if worsening or persistent symptoms.  I wore protective equipment throughout this patient encounter including a mask, gloves, and eye protection.  Hand hygiene was performed before donning protective equipment and after removal when leaving the room. The complete contents of the Assessment and Plan and Data/Lab Results as documented above have been reviewed and addressed by myself with the patient today as part of an ongoing evaluation / treatment plan.  If some of the documentation has been copied from a previous note and is unchanged it indicates that this problem / plan has been assessed today but is stable from a previous visit and no changes have been recommended.

## 2023-05-26 ENCOUNTER — OFFICE VISIT (OUTPATIENT)
Dept: FAMILY MEDICINE CLINIC | Facility: CLINIC | Age: 68
End: 2023-05-26
Payer: MEDICARE

## 2023-05-26 VITALS
HEIGHT: 68 IN | SYSTOLIC BLOOD PRESSURE: 140 MMHG | BODY MASS INDEX: 31.07 KG/M2 | OXYGEN SATURATION: 96 % | TEMPERATURE: 98.2 F | RESPIRATION RATE: 18 BRPM | DIASTOLIC BLOOD PRESSURE: 72 MMHG | WEIGHT: 205 LBS | HEART RATE: 78 BPM

## 2023-05-26 DIAGNOSIS — M79.672 LEFT FOOT PAIN: Primary | ICD-10-CM

## 2023-05-26 DIAGNOSIS — M25.531 RIGHT WRIST PAIN: ICD-10-CM

## 2023-05-26 DIAGNOSIS — I10 HYPERTENSION, BENIGN: Chronic | ICD-10-CM

## 2023-05-26 DIAGNOSIS — B07.0 PLANTAR WART: ICD-10-CM

## 2023-05-26 DIAGNOSIS — E66.09 CLASS 1 OBESITY DUE TO EXCESS CALORIES WITHOUT SERIOUS COMORBIDITY WITH BODY MASS INDEX (BMI) OF 31.0 TO 31.9 IN ADULT: ICD-10-CM

## 2023-05-26 DIAGNOSIS — M65.9 TENOSYNOVITIS: ICD-10-CM

## 2023-05-26 DIAGNOSIS — R06.02 SHORTNESS OF BREATH: ICD-10-CM

## 2023-05-26 DIAGNOSIS — J45.20 MILD INTERMITTENT ASTHMA WITHOUT COMPLICATION: ICD-10-CM

## 2023-05-26 RX ORDER — CIMETIDINE 400 MG/1
400 TABLET, FILM COATED ORAL DAILY
Qty: 30 TABLET | Refills: 5 | Status: SHIPPED | OUTPATIENT
Start: 2023-05-26 | End: 2023-11-22

## 2023-05-26 RX ORDER — PREDNISONE 5 MG/1
TABLET ORAL
Qty: 45 TABLET | Refills: 0 | Status: SHIPPED | OUTPATIENT
Start: 2023-05-26

## 2023-05-26 NOTE — ASSESSMENT & PLAN NOTE
Patient's (Body mass index is 31.18 kg/m².) indicates that they are obese (BMI >30) with health conditions that include hypertension and dyslipidemias . Weight is unchanged. BMI  is above average; BMI management plan is completed. We discussed portion control and increasing exercise.

## 2023-05-27 PROBLEM — B07.0 PLANTAR WART: Status: ACTIVE | Noted: 2023-05-27

## 2023-05-27 PROBLEM — M65.9 TENOSYNOVITIS: Status: ACTIVE | Noted: 2023-05-27

## 2023-05-27 PROBLEM — J45.20 MILD INTERMITTENT ASTHMA WITHOUT COMPLICATION: Status: ACTIVE | Noted: 2023-05-27

## 2023-05-30 ENCOUNTER — TELEPHONE (OUTPATIENT)
Dept: FAMILY MEDICINE CLINIC | Facility: CLINIC | Age: 68
End: 2023-05-30

## 2023-05-30 DIAGNOSIS — R06.02 SHORTNESS OF BREATH: ICD-10-CM

## 2023-05-30 DIAGNOSIS — K21.9 GASTROESOPHAGEAL REFLUX DISEASE, UNSPECIFIED WHETHER ESOPHAGITIS PRESENT: Primary | ICD-10-CM

## 2023-05-30 NOTE — TELEPHONE ENCOUNTER
Famotidine is not covered. Are you okay with sending in something else?    Also Asmanex was denied. Will cover flovent , Folvent 250 Diskus and Arnuity ellipta 200 mcg. Please advise

## 2023-06-02 RX ORDER — FLUTICASONE PROPIONATE 220 UG/1
1 AEROSOL, METERED RESPIRATORY (INHALATION)
Qty: 12 G | Refills: 2 | Status: SHIPPED | OUTPATIENT
Start: 2023-06-02

## 2023-06-02 RX ORDER — OMEPRAZOLE 40 MG/1
40 CAPSULE, DELAYED RELEASE ORAL DAILY
Qty: 90 CAPSULE | Refills: 3 | Status: SHIPPED | OUTPATIENT
Start: 2023-06-02

## 2023-10-17 DIAGNOSIS — M51.9 LUMBAR DISC DISEASE: ICD-10-CM

## 2023-10-17 RX ORDER — MELOXICAM 15 MG/1
15 TABLET ORAL DAILY
Qty: 90 TABLET | Refills: 1 | Status: SHIPPED | OUTPATIENT
Start: 2023-10-17

## 2023-11-24 DIAGNOSIS — I10 HYPERTENSION, BENIGN: Chronic | ICD-10-CM

## 2023-11-27 RX ORDER — LISINOPRIL 20 MG/1
20 TABLET ORAL DAILY
Qty: 90 TABLET | Refills: 0 | Status: SHIPPED | OUTPATIENT
Start: 2023-11-27

## 2023-12-30 DIAGNOSIS — M43.10 DEGENERATIVE SPONDYLOLISTHESIS: ICD-10-CM

## 2024-01-04 RX ORDER — GABAPENTIN 600 MG/1
600 TABLET ORAL NIGHTLY PRN
Qty: 30 TABLET | Refills: 2 | OUTPATIENT
Start: 2024-01-04

## 2024-01-06 DIAGNOSIS — M43.10 DEGENERATIVE SPONDYLOLISTHESIS: ICD-10-CM

## 2024-02-06 RX ORDER — GABAPENTIN 600 MG/1
600 TABLET ORAL NIGHTLY PRN
Qty: 30 TABLET | Refills: 2 | OUTPATIENT
Start: 2024-02-06

## 2024-02-06 NOTE — TELEPHONE ENCOUNTER
Patient has to be seen. Gabapentin is brisa monitored and we need to have a recorded visit every three months

## 2024-02-07 ENCOUNTER — TELEPHONE (OUTPATIENT)
Dept: FAMILY MEDICINE CLINIC | Facility: CLINIC | Age: 69
End: 2024-02-07
Payer: MEDICARE

## 2024-02-07 NOTE — TELEPHONE ENCOUNTER
I called and spoke with patient. I informed patient he is needing an appointment with Dr Calderon before Dr Calderon can refill his gabapentin. Patient does not have his schedule in front of him, and is going to call back to schedule an appointment. Recheck on patient.